# Patient Record
Sex: MALE | Race: WHITE | Employment: OTHER | URBAN - METROPOLITAN AREA
[De-identification: names, ages, dates, MRNs, and addresses within clinical notes are randomized per-mention and may not be internally consistent; named-entity substitution may affect disease eponyms.]

---

## 2017-03-14 ENCOUNTER — OFFICE VISIT (OUTPATIENT)
Dept: CARDIOLOGY CLINIC | Age: 77
End: 2017-03-14

## 2017-03-14 VITALS
SYSTOLIC BLOOD PRESSURE: 118 MMHG | DIASTOLIC BLOOD PRESSURE: 62 MMHG | WEIGHT: 154.2 LBS | BODY MASS INDEX: 20.44 KG/M2 | RESPIRATION RATE: 16 BRPM | HEART RATE: 72 BPM | HEIGHT: 73 IN

## 2017-03-14 DIAGNOSIS — Z95.1 S/P CABG X 1: ICD-10-CM

## 2017-03-14 DIAGNOSIS — I25.10 CORONARY ARTERY DISEASE INVOLVING NATIVE CORONARY ARTERY OF NATIVE HEART WITHOUT ANGINA PECTORIS: Primary | ICD-10-CM

## 2017-03-14 DIAGNOSIS — I26.99 BILATERAL PULMONARY EMBOLISM (HCC): ICD-10-CM

## 2017-03-14 DIAGNOSIS — I10 BENIGN ESSENTIAL HTN: ICD-10-CM

## 2017-03-14 DIAGNOSIS — D15.1 ATRIAL MYXOMA: ICD-10-CM

## 2017-03-14 RX ORDER — ATORVASTATIN CALCIUM 10 MG/1
10 TABLET, FILM COATED ORAL
Qty: 90 TAB | Refills: 0 | Status: SHIPPED | OUTPATIENT
Start: 2017-03-14

## 2017-03-14 RX ORDER — METOPROLOL TARTRATE 25 MG/1
25 TABLET, FILM COATED ORAL 2 TIMES DAILY
Qty: 180 TAB | Refills: 0 | Status: SHIPPED | OUTPATIENT
Start: 2017-03-14

## 2017-03-14 NOTE — PROGRESS NOTES
[place]: Alex Oakes  [phone]    History of Present Illness:   Mr. 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 Additional workup by Dr. Chris Carrillo found a significant decrease in the pulmonary mass and mediastinal lymphadenopathy. 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 He proceeded with a single vessel bypass and left atrial myxoma resection on 09/15/2015 successfully with Dr. Osito Krause. Since his last visit, he has been doing well. He denies any chest pain, shortness of breath, palpitations, lightheadedness or dizziness. He has been compliant with his medications. He does note that his Eliquis is very expensive, but that he is concerned about Coumadin checks and dietary restrictions. EKG is normal sinus rhythm with a heart rate of 72 and right bundle-branch block, previously known. He is compensated on exam with clear lungs and a I/VI systolic murmur at the left upper sternal border, unchanged. Assessment and Plan:   1. Coronary artery disease, status post bypass and resection for left atrial myxoma. Continues stable and compensated. Recommend he follow up in a year, but he was hopeful that he could just follow up as needed. I did recommend that he at least follow up in two years. We did talk about symptoms to look out for. Continue statin. He had bruising on aspirin and Eliquis and continues on just Eliquis. Though this was expensive, he says that he would prefer this rather than the Coumadin where he would have to get the fingerstick checks. 2. Bilateral PE.   Long term decision was made for him to be on long term blood thinners and given his history of pulmonary infarct. Followed by pulmonary. 3. Essential hypertension. Blood pressure is controlled and no changes made. 4. History of DVT. 5. Type 2 diabetes. He  has a past medical history of DM (diabetes mellitus) ([facility] [address] DVT (deep venous thrombosis) (HCC) (Right Leg); HTN (hypertension); PE (pulmonary embolism); Pneumonia; and TIA (transient ischemic attack) (2003). All other systems negative except as above. PE  Vitals:    03/14/17 1231   BP: 118/62   Pulse: 72   Resp: 16   Weight: 154 lb 3.2 oz (69.9 kg)   Height: 6' 1\" (1.854 m)    Body mass index is 20.34 kg/(m^2).    General appearance - alert, well appearing, and in no distress  Mental status - affect appropriate to mood  Eyes - sclera anicteric, moist mucous membranes  Neck - supple, no significant adenopathy, no JVD  Chest - clear to auscultation, no wheezes, rales or rhonchi  Heart - normal rate, regular rhythm, normal S1, S2, I-II/VI systolic murmur LUSB  Abdomen - soft, nontender, nondistended, no masses or organomegaly  Extremities - peripheral pulses normal, no pedal edema      Recent Labs:  Lab Results   Component Value Date/Time    Cholesterol, total 76 07/21/2015 05:12 AM    HDL Cholesterol 23 07/21/2015 05:12 AM    LDL, calculated 37.6 07/21/2015 05:12 AM    Triglyceride 77 07/21/2015 05:12 AM    CHOL/HDL Ratio 3.3 07/21/2015 05:12 AM     Lab Results   Component Value Date/Time    Creatinine 0.97 09/18/2015 04:25 AM     Lab Results   Component Value Date/Time    BUN 30 09/18/2015 04:25 AM     Lab Results   Component Value Date/Time    Potassium 4.6 09/18/2015 04:25 AM     Lab Results   Component Value Date/Time    Hemoglobin A1c 6.1 09/09/2015 09:39 AM     Lab Results   Component Value Date/Time    HGB 10.6 09/18/2015 04:25 AM     Lab Results   Component Value Date/Time    PLATELET 364 49/63/1964 04:25 AM       Reviewed:  Past Medical History:   Diagnosis Date    DM (diabetes mellitus) ([facility] [address]     DVT (deep venous thrombosis) (HCC) Right Leg    HTN (hypertension)     PE (pulmonary embolism)     Pneumonia     TIA (transient ischemic attack) 2003     History   Smoking Status    Never Smoker   Smokeless Tobacco    Never Used     History   Alcohol Use No     Allergies   Allergen Reactions    Pcn [Penicillins] Nausea and Vomiting       Current Outpatient Prescriptions   Medication Sig    metoprolol tartrate (LOPRESSOR) 25 mg tablet Take 1 Tab by mouth two (2) times a day.  apixaban (ELIQUIS) 5 mg tablet Take 1 Tab by mouth two (2) times a day. Indications: Pulmonary Thromboembolism    atorvastatin (LIPITOR) 10 mg tablet Take 1 Tab by mouth nightly.  metFORMIN (GLUCOPHAGE) 1,000 mg tablet Take 1,000 mg by mouth daily.  tamsulosin (FLOMAX) 0.4 mg capsule Take  by mouth as needed.  therapeutic multivitamin (THERAGRAN) tablet Take 1 Tab by mouth daily. No current facility-administered medications for this visit.         MD Naveed Vega [facility] [place]  [address] [facility] [address]  [address]

## 2017-03-14 NOTE — LETTER
3/15/2017 9:12 AM 
 
Patient:  Willard Oquendo YOB: 1940 Date of Visit: 3/14/2017 Dear Raz Guerra MD 
[address] [facility] [address] [phone] VIA In Basket 
 : 
 Mr. 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 Additional workup by Dr. Lina Patterson found a significant decrease in the pulmonary mass and mediastinal lymphadenopathy. 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 He proceeded with a single vessel bypass and left atrial myxoma resection on 09/15/2015 successfully with Dr. Jessica Myers. Since his last visit, he has been doing well. He denies any chest pain, shortness of breath, palpitations, lightheadedness or dizziness. He has been compliant with his medications. He does note that his Eliquis is very expensive, but that he is concerned about Coumadin checks and dietary restrictions. EKG is normal sinus rhythm with a heart rate of 72 and right bundle-branch block, previously known. He is compensated on exam with clear lungs and a I/VI systolic murmur at the left upper sternal border, unchanged. Assessment and Plan: 1. Coronary artery disease, status post bypass and resection for left atrial myxoma. Continues stable and compensated. Recommend he follow up in a year, but he was hopeful that he could just follow up as needed. I did recommend that he at least follow up in two years. We did talk about symptoms to look out for. Continue statin. He had bruising on aspirin and Eliquis and continues on just Eliquis. Though this was expensive, he says that he would prefer this rather than the Coumadin where he would have to get the fingerstick checks. 2. Bilateral PE. Long term decision was made for him to be on long term blood thinners and given his history of pulmonary infarct. Followed by pulmonary. 3. Essential hypertension. Blood pressure is controlled and no changes made. 4. History of DVT. 5. Type 2 diabetes. If you have questions, please do not hesitate to call me. Sincerely, Han Son MD

## 2017-03-15 PROBLEM — I10 BENIGN ESSENTIAL HTN: Status: ACTIVE | Noted: 2017-03-15

## 2017-06-05 RX ORDER — METOPROLOL TARTRATE 25 MG/1
TABLET, FILM COATED ORAL
Qty: 180 TAB | Refills: 0 | Status: SHIPPED | OUTPATIENT
Start: 2017-06-05 | End: 2022-10-29 | Stop reason: CLARIF

## 2022-03-19 PROBLEM — I10 BENIGN ESSENTIAL HTN: Status: ACTIVE | Noted: 2017-03-15

## 2022-10-29 ENCOUNTER — HOSPITAL ENCOUNTER (OUTPATIENT)
Age: 82
Setting detail: OBSERVATION
Discharge: HOME | End: 2022-10-30
Attending: EMERGENCY MEDICINE | Admitting: INTERNAL MEDICINE
Payer: MEDICARE

## 2022-10-29 ENCOUNTER — APPOINTMENT (OUTPATIENT)
Dept: CT IMAGING | Age: 82
End: 2022-10-29
Attending: EMERGENCY MEDICINE
Payer: MEDICARE

## 2022-10-29 ENCOUNTER — APPOINTMENT (OUTPATIENT)
Dept: GENERAL RADIOLOGY | Age: 82
End: 2022-10-29
Attending: EMERGENCY MEDICINE
Payer: MEDICARE

## 2022-10-29 ENCOUNTER — APPOINTMENT (OUTPATIENT)
Dept: MRI IMAGING | Age: 82
End: 2022-10-29
Attending: EMERGENCY MEDICINE
Payer: MEDICARE

## 2022-10-29 DIAGNOSIS — R51.9 ACUTE NONINTRACTABLE HEADACHE, UNSPECIFIED HEADACHE TYPE: Primary | ICD-10-CM

## 2022-10-29 DIAGNOSIS — G45.4 TRANSIENT GLOBAL AMNESIA: ICD-10-CM

## 2022-10-29 PROBLEM — G45.9 TIA (TRANSIENT ISCHEMIC ATTACK): Status: ACTIVE | Noted: 2022-10-29

## 2022-10-29 LAB
ALBUMIN SERPL-MCNC: 3.5 G/DL (ref 3.5–5)
ALBUMIN/GLOB SERPL: 1 {RATIO} (ref 1.1–2.2)
ALP SERPL-CCNC: 80 U/L (ref 45–117)
ALT SERPL-CCNC: 20 U/L (ref 12–78)
ANION GAP SERPL CALC-SCNC: 5 MMOL/L (ref 5–15)
AST SERPL-CCNC: 15 U/L (ref 15–37)
ATRIAL RATE: 77 BPM
BASOPHILS # BLD: 0.1 K/UL (ref 0–0.1)
BASOPHILS NFR BLD: 1 % (ref 0–1)
BILIRUB SERPL-MCNC: 0.8 MG/DL (ref 0.2–1)
BUN SERPL-MCNC: 18 MG/DL (ref 6–20)
BUN/CREAT SERPL: 17 (ref 12–20)
CALCIUM SERPL-MCNC: 9 MG/DL (ref 8.5–10.1)
CALCULATED P AXIS, ECG09: 99 DEGREES
CALCULATED R AXIS, ECG10: -71 DEGREES
CALCULATED T AXIS, ECG11: 42 DEGREES
CHLORIDE SERPL-SCNC: 107 MMOL/L (ref 97–108)
CO2 SERPL-SCNC: 27 MMOL/L (ref 21–32)
COMMENT, HOLDF: NORMAL
CREAT SERPL-MCNC: 1.06 MG/DL (ref 0.7–1.3)
DIAGNOSIS, 93000: NORMAL
DIFFERENTIAL METHOD BLD: NORMAL
EOSINOPHIL # BLD: 0.3 K/UL (ref 0–0.4)
EOSINOPHIL NFR BLD: 5 % (ref 0–7)
ERYTHROCYTE [DISTWIDTH] IN BLOOD BY AUTOMATED COUNT: 13.1 % (ref 11.5–14.5)
GLOBULIN SER CALC-MCNC: 3.4 G/DL (ref 2–4)
GLUCOSE BLD STRIP.AUTO-MCNC: 145 MG/DL (ref 65–117)
GLUCOSE BLD STRIP.AUTO-MCNC: 213 MG/DL (ref 65–117)
GLUCOSE BLD STRIP.AUTO-MCNC: 244 MG/DL (ref 65–117)
GLUCOSE SERPL-MCNC: 163 MG/DL (ref 65–100)
HCT VFR BLD AUTO: 42.9 % (ref 36.6–50.3)
HGB BLD-MCNC: 14.2 G/DL (ref 12.1–17)
IMM GRANULOCYTES # BLD AUTO: 0 K/UL (ref 0–0.04)
IMM GRANULOCYTES NFR BLD AUTO: 0 % (ref 0–0.5)
INR PPP: 1.2 (ref 0.9–1.1)
LYMPHOCYTES # BLD: 1.7 K/UL (ref 0.8–3.5)
LYMPHOCYTES NFR BLD: 26 % (ref 12–49)
MCH RBC QN AUTO: 32.4 PG (ref 26–34)
MCHC RBC AUTO-ENTMCNC: 33.1 G/DL (ref 30–36.5)
MCV RBC AUTO: 97.9 FL (ref 80–99)
MONOCYTES # BLD: 0.7 K/UL (ref 0–1)
MONOCYTES NFR BLD: 10 % (ref 5–13)
NEUTS SEG # BLD: 3.7 K/UL (ref 1.8–8)
NEUTS SEG NFR BLD: 58 % (ref 32–75)
NRBC # BLD: 0 K/UL (ref 0–0.01)
NRBC BLD-RTO: 0 PER 100 WBC
P-R INTERVAL, ECG05: 180 MS
PLATELET # BLD AUTO: 207 K/UL (ref 150–400)
PMV BLD AUTO: 8.9 FL (ref 8.9–12.9)
POTASSIUM SERPL-SCNC: 4.4 MMOL/L (ref 3.5–5.1)
PROT SERPL-MCNC: 6.9 G/DL (ref 6.4–8.2)
PROTHROMBIN TIME: 12.2 SEC (ref 9–11.1)
Q-T INTERVAL, ECG07: 420 MS
QRS DURATION, ECG06: 124 MS
QTC CALCULATION (BEZET), ECG08: 475 MS
RBC # BLD AUTO: 4.38 M/UL (ref 4.1–5.7)
SAMPLES BEING HELD,HOLD: NORMAL
SERVICE CMNT-IMP: ABNORMAL
SODIUM SERPL-SCNC: 139 MMOL/L (ref 136–145)
TROPONIN-HIGH SENSITIVITY: 15 NG/L (ref 0–76)
VENTRICULAR RATE, ECG03: 77 BPM
WBC # BLD AUTO: 6.3 K/UL (ref 4.1–11.1)

## 2022-10-29 PROCEDURE — 96375 TX/PRO/DX INJ NEW DRUG ADDON: CPT

## 2022-10-29 PROCEDURE — 97535 SELF CARE MNGMENT TRAINING: CPT

## 2022-10-29 PROCEDURE — 36415 COLL VENOUS BLD VENIPUNCTURE: CPT

## 2022-10-29 PROCEDURE — 74011636637 HC RX REV CODE- 636/637: Performed by: INTERNAL MEDICINE

## 2022-10-29 PROCEDURE — 80053 COMPREHEN METABOLIC PANEL: CPT

## 2022-10-29 PROCEDURE — 0042T CT CODE NEURO PERF W CBF: CPT

## 2022-10-29 PROCEDURE — 70553 MRI BRAIN STEM W/O & W/DYE: CPT

## 2022-10-29 PROCEDURE — G0378 HOSPITAL OBSERVATION PER HR: HCPCS

## 2022-10-29 PROCEDURE — 71045 X-RAY EXAM CHEST 1 VIEW: CPT

## 2022-10-29 PROCEDURE — 96366 THER/PROPH/DIAG IV INF ADDON: CPT

## 2022-10-29 PROCEDURE — 74011250637 HC RX REV CODE- 250/637: Performed by: INTERNAL MEDICINE

## 2022-10-29 PROCEDURE — 97165 OT EVAL LOW COMPLEX 30 MIN: CPT

## 2022-10-29 PROCEDURE — 70450 CT HEAD/BRAIN W/O DYE: CPT

## 2022-10-29 PROCEDURE — 85025 COMPLETE CBC W/AUTO DIFF WBC: CPT

## 2022-10-29 PROCEDURE — 70496 CT ANGIOGRAPHY HEAD: CPT

## 2022-10-29 PROCEDURE — 85610 PROTHROMBIN TIME: CPT

## 2022-10-29 PROCEDURE — 84484 ASSAY OF TROPONIN QUANT: CPT

## 2022-10-29 PROCEDURE — 74011000636 HC RX REV CODE- 636: Performed by: EMERGENCY MEDICINE

## 2022-10-29 PROCEDURE — 74011250637 HC RX REV CODE- 250/637: Performed by: PSYCHIATRY & NEUROLOGY

## 2022-10-29 PROCEDURE — 74011250636 HC RX REV CODE- 250/636: Performed by: EMERGENCY MEDICINE

## 2022-10-29 PROCEDURE — 93005 ELECTROCARDIOGRAM TRACING: CPT

## 2022-10-29 PROCEDURE — 96365 THER/PROPH/DIAG IV INF INIT: CPT

## 2022-10-29 PROCEDURE — 74011250636 HC RX REV CODE- 250/636: Performed by: INTERNAL MEDICINE

## 2022-10-29 PROCEDURE — 99285 EMERGENCY DEPT VISIT HI MDM: CPT

## 2022-10-29 PROCEDURE — 99205 OFFICE O/P NEW HI 60 MIN: CPT | Performed by: PSYCHIATRY & NEUROLOGY

## 2022-10-29 PROCEDURE — 82962 GLUCOSE BLOOD TEST: CPT

## 2022-10-29 PROCEDURE — A9576 INJ PROHANCE MULTIPACK: HCPCS | Performed by: INTERNAL MEDICINE

## 2022-10-29 PROCEDURE — 97161 PT EVAL LOW COMPLEX 20 MIN: CPT

## 2022-10-29 RX ORDER — DROPERIDOL 2.5 MG/ML
0.62 INJECTION, SOLUTION INTRAMUSCULAR; INTRAVENOUS ONCE
Status: COMPLETED | OUTPATIENT
Start: 2022-10-29 | End: 2022-10-29

## 2022-10-29 RX ORDER — INSULIN LISPRO 100 [IU]/ML
INJECTION, SOLUTION INTRAVENOUS; SUBCUTANEOUS
Status: DISCONTINUED | OUTPATIENT
Start: 2022-10-29 | End: 2022-10-30 | Stop reason: HOSPADM

## 2022-10-29 RX ORDER — ATORVASTATIN CALCIUM 10 MG/1
10 TABLET, FILM COATED ORAL
Status: DISCONTINUED | OUTPATIENT
Start: 2022-10-29 | End: 2022-10-30 | Stop reason: HOSPADM

## 2022-10-29 RX ORDER — DOXYCYCLINE 100 MG/1
100 CAPSULE ORAL 2 TIMES DAILY
COMMUNITY
End: 2022-11-22 | Stop reason: ALTCHOICE

## 2022-10-29 RX ORDER — AMITRIPTYLINE HYDROCHLORIDE 10 MG/1
10 TABLET, FILM COATED ORAL
Status: DISCONTINUED | OUTPATIENT
Start: 2022-10-29 | End: 2022-10-30 | Stop reason: HOSPADM

## 2022-10-29 RX ORDER — DEXTROSE MONOHYDRATE 100 MG/ML
0-250 INJECTION, SOLUTION INTRAVENOUS AS NEEDED
Status: DISCONTINUED | OUTPATIENT
Start: 2022-10-29 | End: 2022-10-30 | Stop reason: HOSPADM

## 2022-10-29 RX ORDER — MAGNESIUM SULFATE HEPTAHYDRATE 40 MG/ML
2 INJECTION, SOLUTION INTRAVENOUS
Status: COMPLETED | OUTPATIENT
Start: 2022-10-29 | End: 2022-10-29

## 2022-10-29 RX ORDER — PREGABALIN 75 MG/1
75 CAPSULE ORAL
Status: DISCONTINUED | OUTPATIENT
Start: 2022-10-29 | End: 2022-10-29

## 2022-10-29 RX ORDER — THERA TABS 400 MCG
1 TAB ORAL DAILY
Status: DISCONTINUED | OUTPATIENT
Start: 2022-10-29 | End: 2022-10-30 | Stop reason: HOSPADM

## 2022-10-29 RX ORDER — GUAIFENESIN 100 MG/5ML
81 LIQUID (ML) ORAL DAILY
Status: DISCONTINUED | OUTPATIENT
Start: 2022-10-29 | End: 2022-10-30 | Stop reason: HOSPADM

## 2022-10-29 RX ORDER — ACETAMINOPHEN 325 MG/1
650 TABLET ORAL
Status: DISCONTINUED | OUTPATIENT
Start: 2022-10-29 | End: 2022-10-30 | Stop reason: HOSPADM

## 2022-10-29 RX ORDER — LANOLIN ALCOHOL/MO/W.PET/CERES
6 CREAM (GRAM) TOPICAL
Status: DISCONTINUED | OUTPATIENT
Start: 2022-10-29 | End: 2022-10-30 | Stop reason: HOSPADM

## 2022-10-29 RX ORDER — TAMSULOSIN HYDROCHLORIDE 0.4 MG/1
0.4 CAPSULE ORAL DAILY
Status: DISCONTINUED | OUTPATIENT
Start: 2022-10-29 | End: 2022-10-30 | Stop reason: HOSPADM

## 2022-10-29 RX ORDER — BUTALBITAL, ACETAMINOPHEN AND CAFFEINE 50; 325; 40 MG/1; MG/1; MG/1
1 TABLET ORAL
Status: DISCONTINUED | OUTPATIENT
Start: 2022-10-29 | End: 2022-10-30 | Stop reason: HOSPADM

## 2022-10-29 RX ORDER — HYDRALAZINE HYDROCHLORIDE 20 MG/ML
10 INJECTION INTRAMUSCULAR; INTRAVENOUS
Status: DISCONTINUED | OUTPATIENT
Start: 2022-10-29 | End: 2022-10-30 | Stop reason: HOSPADM

## 2022-10-29 RX ORDER — MAGNESIUM SULFATE 100 %
4 CRYSTALS MISCELLANEOUS AS NEEDED
Status: DISCONTINUED | OUTPATIENT
Start: 2022-10-29 | End: 2022-10-30 | Stop reason: HOSPADM

## 2022-10-29 RX ORDER — METOPROLOL TARTRATE 25 MG/1
25 TABLET, FILM COATED ORAL 2 TIMES DAILY
Status: DISCONTINUED | OUTPATIENT
Start: 2022-10-29 | End: 2022-10-30 | Stop reason: HOSPADM

## 2022-10-29 RX ADMIN — METOPROLOL TARTRATE 25 MG: 25 TABLET, FILM COATED ORAL at 08:45

## 2022-10-29 RX ADMIN — MELATONIN 6 MG: at 22:14

## 2022-10-29 RX ADMIN — MAGNESIUM SULFATE HEPTAHYDRATE 2 G: 40 INJECTION, SOLUTION INTRAVENOUS at 04:04

## 2022-10-29 RX ADMIN — APIXABAN 5 MG: 5 TABLET, FILM COATED ORAL at 17:39

## 2022-10-29 RX ADMIN — TAMSULOSIN HYDROCHLORIDE 0.4 MG: 0.4 CAPSULE ORAL at 08:45

## 2022-10-29 RX ADMIN — Medication 2 UNITS: at 22:14

## 2022-10-29 RX ADMIN — Medication 2 UNITS: at 17:45

## 2022-10-29 RX ADMIN — METOPROLOL TARTRATE 25 MG: 25 TABLET, FILM COATED ORAL at 17:39

## 2022-10-29 RX ADMIN — THERA TABS 1 TABLET: TAB at 08:45

## 2022-10-29 RX ADMIN — IOPAMIDOL 100 ML: 755 INJECTION, SOLUTION INTRAVENOUS at 02:40

## 2022-10-29 RX ADMIN — APIXABAN 5 MG: 5 TABLET, FILM COATED ORAL at 08:45

## 2022-10-29 RX ADMIN — AMITRIPTYLINE HYDROCHLORIDE 10 MG: 10 TABLET, FILM COATED ORAL at 22:14

## 2022-10-29 RX ADMIN — DROPERIDOL 0.62 MG: 2.5 INJECTION, SOLUTION INTRAMUSCULAR; INTRAVENOUS at 04:04

## 2022-10-29 RX ADMIN — ATORVASTATIN CALCIUM 10 MG: 10 TABLET, FILM COATED ORAL at 22:14

## 2022-10-29 RX ADMIN — Medication 3 UNITS: at 13:06

## 2022-10-29 RX ADMIN — GADOTERIDOL 15 ML: 279.3 INJECTION, SOLUTION INTRAVENOUS at 08:17

## 2022-10-29 RX ADMIN — BUTALBITAL, ACETAMINOPHEN, AND CAFFEINE 1 TABLET: 50; 325; 40 TABLET ORAL at 17:53

## 2022-10-29 RX ADMIN — ASPIRIN 81 MG: 81 TABLET, CHEWABLE ORAL at 08:45

## 2022-10-29 NOTE — PROGRESS NOTES
-Please complete MRI History and Safety Screening Form   - Patient cannot be scanned until this form is completed, including signatures, and reviewed in MRI to ensure patient is SAFE and eligible for MRI. - CALL MRI when this has been successfully completed at [phone].

## 2022-10-29 NOTE — ED PROVIDER NOTES
EMERGENCY DEPARTMENT HISTORY AND PHYSICAL EXAM      Date: (Not on file)  Patient Name: Srinivas Mcmahan    History of Presenting Illness     Chief Complaint   Patient presents with    Headache       History Provided By: Patient    HPI: Srinivas Mcmahan, 80 y.o. male presents to the ED with cc of headache and confusion. Patient states that he did have COVID approximately 4 weeks ago and since then he has had a headache. He states the headache has been worsening in severity since. He states that tonight his headache was severe when he went to bed at approximately 9:00. He states upon waking at 1 AM he did not know where he was who he was with or where he was at. Wife denies any slurred speech or facial droop or lack of strength on one side. He denies any current lack of sensation or focal weakness. He states he has a headache currently rated 10 out of 10 with no alleviating exacerbating factors. He denies any photophobia or visual disturbance. He denies any chest pain or shortness of breath. He denies any abdominal pain, nausea, vomiting or diarrhea. He denies any recent urinary symptoms. He states that he recently had a skin cancer placed taken off the back of his neck and has been taking doxycycline since then. There are no other complaints, changes, or physical findings at this time. PCP: Sekou Dunham MD    No current facility-administered medications on file prior to encounter. Current Outpatient Medications on File Prior to Encounter   Medication Sig Dispense Refill    metoprolol tartrate (LOPRESSOR) 25 mg tablet TAKE 1 TABLET BY MOUTH TWICE DAILY 180 Tab 0    metoprolol tartrate (LOPRESSOR) 25 mg tablet Take 1 Tab by mouth two (2) times a day. 180 Tab 0    apixaban (ELIQUIS) 5 mg tablet Take 1 Tab by mouth two (2) times a day. Indications: Pulmonary Thromboembolism 180 Tab 0    atorvastatin (LIPITOR) 10 mg tablet Take 1 Tab by mouth nightly.  90 Tab 0    metFORMIN (GLUCOPHAGE) 1,000 mg tablet Take 1,000 mg by mouth daily. tamsulosin (FLOMAX) 0.4 mg capsule Take  by mouth as needed. 3    therapeutic multivitamin (THERAGRAN) tablet Take 1 Tab by mouth daily. Past History     Past Medical History:  Past Medical History:   Diagnosis Date    DM (diabetes mellitus) ([facility] [address]     DVT (deep venous thrombosis) (HCC) Right Leg    HTN (hypertension)     PE (pulmonary embolism)     Pneumonia     TIA (transient ischemic attack) 2003       Past Surgical History:  Past Surgical History:   Procedure Laterality Date    CARDIAC SURG PROCEDURE UNLIST      cardiac tumor removed    HX CORONARY ARTERY BYPASS GRAFT  7/20/15       Family History:  Family History   Problem Relation Age of Onset    Heart Attack Father     Cancer Father         Lung       Social History:  Social History     Tobacco Use    Smoking status: Never    Smokeless tobacco: Never   Substance Use Topics    Alcohol use: No     Alcohol/week: 0.0 standard drinks    Drug use: No       Allergies: Allergies   Allergen Reactions    Pcn [Penicillins] Nausea and Vomiting         Review of Systems   Review of Systems   Constitutional: Negative. Negative for appetite change, chills, fatigue and fever. HENT: Negative. Negative for congestion, rhinorrhea, sinus pressure and sore throat. Eyes: Negative. Respiratory: Negative. Negative for cough, choking, chest tightness, shortness of breath and wheezing. Cardiovascular: Negative. Negative for chest pain, palpitations and leg swelling. Gastrointestinal:  Negative for abdominal pain, constipation, diarrhea, nausea and vomiting. Endocrine: Negative. Genitourinary: Negative. Negative for difficulty urinating, dysuria, flank pain and urgency. Musculoskeletal: Negative. Skin: Negative. Neurological:  Positive for headaches. Negative for dizziness, speech difficulty, weakness, light-headedness and numbness. Psychiatric/Behavioral:  Positive for confusion.     All other systems reviewed and are negative. Physical Exam   Physical Exam  Vitals and nursing note reviewed. Constitutional:       General: He is not in acute distress. Appearance: He is well-developed. He is not diaphoretic. HENT:      Head: Normocephalic and atraumatic. Mouth/Throat:      Mouth: Mucous membranes are moist.      Pharynx: No oropharyngeal exudate. Eyes:      Extraocular Movements: Extraocular movements intact. Conjunctiva/sclera: Conjunctivae normal.      Pupils: Pupils are equal, round, and reactive to light. Neck:      Vascular: No JVD. Trachea: No tracheal deviation. Cardiovascular:      Rate and Rhythm: Normal rate and regular rhythm. Heart sounds: Normal heart sounds. No murmur heard. Pulmonary:      Effort: Pulmonary effort is normal. No respiratory distress. Breath sounds: Normal breath sounds. No stridor. No wheezing or rales. Abdominal:      General: There is no distension. Palpations: Abdomen is soft. Tenderness: There is no abdominal tenderness. There is no guarding or rebound. Musculoskeletal:         General: No tenderness. Normal range of motion. Cervical back: Normal range of motion and neck supple. Right lower leg: No edema. Left lower leg: No edema. Skin:     General: Skin is warm and dry. Capillary Refill: Capillary refill takes less than 2 seconds. Neurological:      Mental Status: He is alert and oriented to person, place, and time. Cranial Nerves: No cranial nerve deficit.       Comments: No focal motor or sensory deficits    Psychiatric:         Behavior: Behavior normal.      Comments: Anxious        Diagnostic Study Results     Labs -     Recent Results (from the past 12 hour(s))   GLUCOSE, POC    Collection Time: 10/29/22  2:10 AM   Result Value Ref Range    Glucose (POC) 145 (H) 65 - 117 mg/dL    Performed by Farrah Herbert    CBC WITH AUTOMATED DIFF    Collection Time: 10/29/22  2:21 AM Result Value Ref Range    WBC 6.3 4.1 - 11.1 K/uL    RBC 4.38 4.10 - 5.70 M/uL    HGB 14.2 12.1 - 17.0 g/dL    HCT 42.9 36.6 - 50.3 %    MCV 97.9 80.0 - 99.0 FL    MCH 32.4 26.0 - 34.0 PG    MCHC 33.1 30.0 - 36.5 g/dL    RDW 13.1 11.5 - 14.5 %    PLATELET 905 024 - 013 K/uL    MPV 8.9 8.9 - 12.9 FL    NRBC 0.0 0  WBC    ABSOLUTE NRBC 0.00 0.00 - 0.01 K/uL    NEUTROPHILS 58 32 - 75 %    LYMPHOCYTES 26 12 - 49 %    MONOCYTES 10 5 - 13 %    EOSINOPHILS 5 0 - 7 %    BASOPHILS 1 0 - 1 %    IMMATURE GRANULOCYTES 0 0.0 - 0.5 %    ABS. NEUTROPHILS 3.7 1.8 - 8.0 K/UL    ABS. LYMPHOCYTES 1.7 0.8 - 3.5 K/UL    ABS. MONOCYTES 0.7 0.0 - 1.0 K/UL    ABS. EOSINOPHILS 0.3 0.0 - 0.4 K/UL    ABS. BASOPHILS 0.1 0.0 - 0.1 K/UL    ABS. IMM. GRANS. 0.0 0.00 - 0.04 K/UL    DF AUTOMATED     METABOLIC PANEL, COMPREHENSIVE    Collection Time: 10/29/22  2:21 AM   Result Value Ref Range    Sodium 139 136 - 145 mmol/L    Potassium 4.4 3.5 - 5.1 mmol/L    Chloride 107 97 - 108 mmol/L    CO2 27 21 - 32 mmol/L    Anion gap 5 5 - 15 mmol/L    Glucose 163 (H) 65 - 100 mg/dL    BUN 18 6 - 20 MG/DL    Creatinine 1.06 0.70 - 1.30 MG/DL    BUN/Creatinine ratio 17 12 - 20      eGFR >60 >60 ml/min/1.73m2    Calcium 9.0 8.5 - 10.1 MG/DL    Bilirubin, total 0.8 0.2 - 1.0 MG/DL    ALT (SGPT) 20 12 - 78 U/L    AST (SGOT) 15 15 - 37 U/L    Alk.  phosphatase 80 45 - 117 U/L    Protein, total 6.9 6.4 - 8.2 g/dL    Albumin 3.5 3.5 - 5.0 g/dL    Globulin 3.4 2.0 - 4.0 g/dL    A-G Ratio 1.0 (L) 1.1 - 2.2     PROTHROMBIN TIME + INR    Collection Time: 10/29/22  2:21 AM   Result Value Ref Range    INR 1.2 (H) 0.9 - 1.1      Prothrombin time 12.2 (H) 9.0 - 11.1 sec   TROPONIN-HIGH SENSITIVITY    Collection Time: 10/29/22  2:21 AM   Result Value Ref Range    Troponin-High Sensitivity 15 0 - 76 ng/L   SAMPLES BEING HELD    Collection Time: 10/29/22  2:21 AM   Result Value Ref Range    SAMPLES BEING HELD RED     COMMENT        Add-on orders for these samples will be processed based on acceptable specimen integrity and analyte stability, which may vary by analyte. EKG, 12 LEAD, INITIAL    Collection Time: 10/29/22  2:37 AM   Result Value Ref Range    Ventricular Rate 77 BPM    Atrial Rate 77 BPM    P-R Interval 180 ms    QRS Duration 124 ms    Q-T Interval 420 ms    QTC Calculation (Bezet) 475 ms    Calculated P Axis 99 degrees    Calculated R Axis -71 degrees    Calculated T Axis 42 degrees    Diagnosis       Sinus rhythm with occasional premature ventricular complexes  Right bundle branch block  Left anterior fascicular block  ** Bifascicular block **  Anterior infarct , age undetermined  When compared with ECG of 09-SEP-2015 10:59,  premature ventricular complexes are now present  (RBBB and left anterior fascicular block) is now present  Anterior infarct is now present         Radiologic Studies -   XR CHEST PORT   Final Result   No acute findings. CTA CODE NEURO HEAD AND NECK W CONT   Final Result   1. No acute large vessel occlusion. Atherosclerotic vascular disease without   flow-limiting stenosis as above. 2.  Areas of hypoperfusion in the superior frontal lobes bilaterally most likely   artifactual, though if there is clinical concern for acute infarction consider   MRI. 3. Vague nodular densities superior segment right lower lobe, and interval   enlargement of AP window lymph nodes up to 1.9 cm. This may be better assessed   with nonemergent outpatient CT chest as clinically indicated. CT CODE NEURO PERF W CBF   Final Result   1. No acute large vessel occlusion. Atherosclerotic vascular disease without   flow-limiting stenosis as above. 2.  Areas of hypoperfusion in the superior frontal lobes bilaterally most likely   artifactual, though if there is clinical concern for acute infarction consider   MRI. 3. Vague nodular densities superior segment right lower lobe, and interval   enlargement of AP window lymph nodes up to 1.9 cm.  This may be better assessed   with nonemergent outpatient CT chest as clinically indicated. CT CODE NEURO HEAD WO CONTRAST   Final Result   No acute intracranial hemorrhage, mass or infarct. Cerebral atrophy   and white matter change most compatible with chronic small vessel ischemic   and/or senescent change. Intracranial atherosclerosis. Paranasal sinus disease. MRI BRAIN W WO CONT    (Results Pending)     CT Results  (Last 48 hours)      None          CXR Results  (Last 48 hours)      None            Medical Decision Making   I am the first provider for this patient. I reviewed the vital signs, available nursing notes, past medical history, past surgical history, family history and social history. Vital Signs-Reviewed the patient's vital signs. Patient Vitals for the past 12 hrs:   Temp Pulse Resp BP SpO2   10/29/22 0158 98.1 °F (36.7 °C) 83 18 (!) 180/64 98 %       EKG interpretation: (Preliminary)  Sinus rhythm rate 77, left anterior fascicular block, right bundle branch block, prolonged QRS, no acute ST changes. Records Reviewed: Nursing Notes, Old Medical Records, Previous Radiology Studies, and Previous Laboratory Studies, patient with a history of hypertension coronary artery disease is also had a prior PE and DVT currently on Eliquis. Provider Notes (Medical Decision Making):   DDx-TGA, TIA, migraine, ICH, CVA    ED Course:   Initial assessment performed. The patients presenting problems have been discussed, and they are in agreement with the care plan formulated and outlined with them. I have encouraged them to ask questions as they arise throughout their visit. Consult note:  Case discussed with telemetry neurology. Images all unremarkable.   States that symptoms probably more consistent with migraine however given the patient's prior history of COVID a month ago would recommend admission for MRI with and without to rule out any possible inflammatory changes within the cerebral resulting in his headache however he would recommend treatment as a migraine. Patient was given a dose of Inapsine in addition to 2 g of magnesium. I have placed the order for the MRI and discussed plan of care with the patient and his wife. Consult note:  Case discussed with Dr. Marcie Patino patient will be evaluated and admitted. Disposition:  Admit      Diagnosis     Clinical Impression:   1. Acute nonintractable headache, unspecified headache type    2. Transient global amnesia        Attestations:    Jamar Carr, DO        Please note that this dictation was completed with [employer], the computer voice recognition software. Quite often unanticipated grammatical, syntax, homophones, and other interpretive errors are inadvertently transcribed by the computer software. Please disregard these errors. Please excuse any errors that have escaped final proofreading. Thank you. Yes

## 2022-10-29 NOTE — PROGRESS NOTES
Nutrition: Chart reviewed due to MST referral; this was filed incorrectly. See below. RD available by consult if needs arise; otherwise, patient will be rescreened per policy. Thanks.      Malnutrition Screening Tool    Flowsheet Row Most Recent Value   Recently Lost Weight Without Trying No filed at 10/29/2022 1144   If Yes, How Much Weight Loss Unsure filed at 10/29/2022 1144   Eating Poorly Due to Decreased Appetite No filed at 10/29/2022 1144   MST Score 2 filed at 10/29/2022 [address],

## 2022-10-29 NOTE — H&P
GENERAL GENERIC H&P/CONSULT  Presenting complaint:Headache/confusion    Subjective: 20-year-old male with past medical history of multiple comorbidities presents to [facility] with complaints of headaches as well as confusion. Patient states he was in his usual state of health until earlier this a.m. when he had sudden onset generalized headache as well as an episode of confusion where patient was unaware of his surroundings, subsequent to which patient presented to the ED. The symptoms have since resolved. Patient denies any fevers chills nausea vomiting lightheadedness dizziness dyspnea orthopnea paroxysmal nocturnal dyspnea chest pain palpitations focal weakness loss of sensation auditory or visual symptoms abdominal stool or urinary complaints or any other associated symptoms. Patient endorses no recent sick contacts or travel activity    Past Medical History:   Diagnosis Date    DM (diabetes mellitus) ([facility] [address]     DVT (deep venous thrombosis) ([facility] [address] Right Leg    HTN (hypertension)     PE (pulmonary embolism)     Pneumonia     TIA (transient ischemic attack) 2003      Past Surgical History:   Procedure Laterality Date    CARDIAC SURG PROCEDURE UNLIST      cardiac tumor removed    HX CORONARY ARTERY BYPASS GRAFT  7/20/15      Prior to Admission medications    Medication Sig Start Date End Date Taking? Authorizing Provider   metoprolol tartrate (LOPRESSOR) 25 mg tablet Take 1 Tab by mouth two (2) times a day. 3/14/17  Yes Chriss Haines MD   apixaban (ELIQUIS) 5 mg tablet Take 1 Tab by mouth two (2) times a day. Indications: Pulmonary Thromboembolism 3/14/17  Yes Chriss Haines MD   atorvastatin (LIPITOR) 10 mg tablet Take 1 Tab by mouth nightly. 3/14/17  Yes Chriss Haines MD   metFORMIN (GLUCOPHAGE) 1,000 mg tablet Take 1,000 mg by mouth daily. Yes Provider, Historical   tamsulosin (FLOMAX) 0.4 mg capsule Take  by mouth as needed.  1/13/16  Yes Provider, Historical   therapeutic multivitamin [facility]) tablet Take 1 Tab by mouth daily. Yes Provider, Historical     Allergies   Allergen Reactions    Pcn [Penicillins] Nausea and Vomiting      Social History     Tobacco Use    Smoking status: Never    Smokeless tobacco: Never   Substance Use Topics    Alcohol use: No     Alcohol/week: 0.0 standard drinks      Family History   Problem Relation Age of Onset    Heart Attack Father     Cancer Father         Lung      Review of Systems   Constitutional:  Positive for activity change, appetite change and fatigue. Negative for chills, diaphoresis, fever and unexpected weight change. HENT:  Negative for congestion, dental problem, drooling, ear discharge, ear pain, facial swelling, hearing loss, mouth sores, nosebleeds, postnasal drip, rhinorrhea, sinus pressure, sinus pain, sneezing, sore throat, tinnitus, trouble swallowing and voice change. Eyes:  Negative for photophobia, pain, discharge, redness, itching and visual disturbance. Respiratory:  Negative for apnea, cough, choking, chest tightness, shortness of breath, wheezing and stridor. Cardiovascular:  Negative for chest pain, palpitations and leg swelling. Gastrointestinal:  Negative for abdominal distention, abdominal pain, anal bleeding, blood in stool, constipation, diarrhea, nausea, rectal pain and vomiting. Endocrine: Negative for cold intolerance, heat intolerance, polydipsia, polyphagia and polyuria. Genitourinary:  Negative for decreased urine volume, difficulty urinating, dysuria, enuresis, flank pain, frequency, genital sores, hematuria, penile discharge, penile pain, penile swelling, scrotal swelling, testicular pain and urgency. Musculoskeletal:  Negative for arthralgias, back pain, gait problem, joint swelling, myalgias, neck pain and neck stiffness. Skin:  Negative for color change, pallor, rash and wound. Allergic/Immunologic: Negative for environmental allergies, food allergies and immunocompromised state. Neurological:  Positive for weakness. Negative for dizziness, tremors, seizures, syncope, facial asymmetry, speech difficulty, light-headedness, numbness and headaches. Hematological:  Negative for adenopathy. Does not bruise/bleed easily. Psychiatric/Behavioral:  Negative for agitation, behavioral problems, confusion, decreased concentration, dysphoric mood, hallucinations, self-injury, sleep disturbance and suicidal ideas. The patient is not nervous/anxious and is not hyperactive. Objective:    No intake/output data recorded. No intake/output data recorded. Patient Vitals for the past 8 hrs:   BP Temp Pulse Resp SpO2 Height Weight   10/29/22 0549 -- -- 76 28 98 % -- --   10/29/22 0522 (!) 153/80 -- 76 25 97 % -- --   10/29/22 0449 (!) 156/61 -- 75 19 97 % -- --   10/29/22 0404 (!) 126/113 -- 75 -- -- -- --   10/29/22 0349 (!) 155/74 -- 71 26 97 % -- --   10/29/22 0249 (!) 173/61 -- 70 20 98 % -- --   10/29/22 0158 (!) 180/64 98.1 °F (36.7 °C) 83 18 98 % 6' 2\" (1.88 m) 67.7 kg (149 lb 4 oz)     Physical Exam  Vitals reviewed. Constitutional:       General: He is not in acute distress. Appearance: He is normal weight. He is ill-appearing. He is not toxic-appearing or diaphoretic. HENT:      Head: Normocephalic and atraumatic. Nose: Nose normal. No congestion or rhinorrhea. Mouth/Throat:      Mouth: Mucous membranes are moist.      Pharynx: Oropharynx is clear. No oropharyngeal exudate or posterior oropharyngeal erythema. Eyes:      General: No scleral icterus. Right eye: No discharge. Left eye: No discharge. Extraocular Movements: Extraocular movements intact. Conjunctiva/sclera: Conjunctivae normal.      Pupils: Pupils are equal, round, and reactive to light. Neck:      Vascular: No carotid bruit. Cardiovascular:      Rate and Rhythm: Normal rate and regular rhythm. Pulses: Normal pulses. Heart sounds: Normal heart sounds. No murmur heard. No friction rub. No gallop. Pulmonary:      Effort: Pulmonary effort is normal. No respiratory distress. Breath sounds: Normal breath sounds. No stridor. No wheezing, rhonchi or rales. Chest:      Chest wall: No tenderness. Abdominal:      General: Abdomen is flat. Bowel sounds are normal. There is no distension. Palpations: Abdomen is soft. There is no mass. Tenderness: There is no abdominal tenderness. There is no right CVA tenderness, left CVA tenderness, guarding or rebound. Hernia: No hernia is present. Musculoskeletal:         General: No swelling, tenderness, deformity or signs of injury. Normal range of motion. Cervical back: Normal range of motion and neck supple. No rigidity or tenderness. Right lower leg: No edema. Left lower leg: No edema. Lymphadenopathy:      Cervical: No cervical adenopathy. Skin:     General: Skin is warm. Capillary Refill: Capillary refill takes less than 2 seconds. Coloration: Skin is not jaundiced or pale. Findings: No bruising, erythema, lesion or rash. Neurological:      General: No focal deficit present. Mental Status: He is alert and oriented to person, place, and time. Mental status is at baseline. Cranial Nerves: No cranial nerve deficit. Sensory: No sensory deficit. Motor: No weakness. Coordination: Coordination normal.      Gait: Gait normal.      Deep Tendon Reflexes: Reflexes normal.   Psychiatric:         Mood and Affect: Mood normal.         Behavior: Behavior normal.         Thought Content:  Thought content normal.         Judgment: Judgment normal.        Labs:    Recent Results (from the past 24 hour(s))   GLUCOSE, POC    Collection Time: 10/29/22  2:10 AM   Result Value Ref Range    Glucose (POC) 145 (H) 65 - 117 mg/dL    Performed by Emerita CEDILLO    CBC WITH AUTOMATED DIFF    Collection Time: 10/29/22  2:21 AM   Result Value Ref Range    WBC 6.3 4.1 - 11.1 K/uL    RBC 4. 38 4.10 - 5.70 M/uL    HGB 14.2 12.1 - 17.0 g/dL    HCT 42.9 36.6 - 50.3 %    MCV 97.9 80.0 - 99.0 FL    MCH 32.4 26.0 - 34.0 PG    MCHC 33.1 30.0 - 36.5 g/dL    RDW 13.1 11.5 - 14.5 %    PLATELET 789 894 - 990 K/uL    MPV 8.9 8.9 - 12.9 FL    NRBC 0.0 0  WBC    ABSOLUTE NRBC 0.00 0.00 - 0.01 K/uL    NEUTROPHILS 58 32 - 75 %    LYMPHOCYTES 26 12 - 49 %    MONOCYTES 10 5 - 13 %    EOSINOPHILS 5 0 - 7 %    BASOPHILS 1 0 - 1 %    IMMATURE GRANULOCYTES 0 0.0 - 0.5 %    ABS. NEUTROPHILS 3.7 1.8 - 8.0 K/UL    ABS. LYMPHOCYTES 1.7 0.8 - 3.5 K/UL    ABS. MONOCYTES 0.7 0.0 - 1.0 K/UL    ABS. EOSINOPHILS 0.3 0.0 - 0.4 K/UL    ABS. BASOPHILS 0.1 0.0 - 0.1 K/UL    ABS. IMM. GRANS. 0.0 0.00 - 0.04 K/UL    DF AUTOMATED     METABOLIC PANEL, COMPREHENSIVE    Collection Time: 10/29/22  2:21 AM   Result Value Ref Range    Sodium 139 136 - 145 mmol/L    Potassium 4.4 3.5 - 5.1 mmol/L    Chloride 107 97 - 108 mmol/L    CO2 27 21 - 32 mmol/L    Anion gap 5 5 - 15 mmol/L    Glucose 163 (H) 65 - 100 mg/dL    BUN 18 6 - 20 MG/DL    Creatinine 1.06 0.70 - 1.30 MG/DL    BUN/Creatinine ratio 17 12 - 20      eGFR >60 >60 ml/min/1.73m2    Calcium 9.0 8.5 - 10.1 MG/DL    Bilirubin, total 0.8 0.2 - 1.0 MG/DL    ALT (SGPT) 20 12 - 78 U/L    AST (SGOT) 15 15 - 37 U/L    Alk.  phosphatase 80 45 - 117 U/L    Protein, total 6.9 6.4 - 8.2 g/dL    Albumin 3.5 3.5 - 5.0 g/dL    Globulin 3.4 2.0 - 4.0 g/dL    A-G Ratio 1.0 (L) 1.1 - 2.2     PROTHROMBIN TIME + INR    Collection Time: 10/29/22  2:21 AM   Result Value Ref Range    INR 1.2 (H) 0.9 - 1.1      Prothrombin time 12.2 (H) 9.0 - 11.1 sec   TROPONIN-HIGH SENSITIVITY    Collection Time: 10/29/22  2:21 AM   Result Value Ref Range    Troponin-High Sensitivity 15 0 - 76 ng/L   SAMPLES BEING HELD    Collection Time: 10/29/22  2:21 AM   Result Value Ref Range    SAMPLES BEING HELD RED     COMMENT        Add-on orders for these samples will be processed based on acceptable specimen integrity and analyte stability, which may vary by analyte. EKG, 12 LEAD, INITIAL    Collection Time: 10/29/22  2:37 AM   Result Value Ref Range    Ventricular Rate 77 BPM    Atrial Rate 77 BPM    P-R Interval 180 ms    QRS Duration 124 ms    Q-T Interval 420 ms    QTC Calculation (Bezet) 475 ms    Calculated P Axis 99 degrees    Calculated R Axis -71 degrees    Calculated T Axis 42 degrees    Diagnosis       Sinus rhythm with occasional premature ventricular complexes  Right bundle branch block  Left anterior fascicular block  ** Bifascicular block **  Anterior infarct , age undetermined  When compared with ECG of 09-SEP-2015 10:59,  premature ventricular complexes are now present  (RBBB and left anterior fascicular block) is now present  Anterior infarct is now present         ECG: nonspecific ST and T waves changes   CT head:IMPRESSION  No acute intracranial hemorrhage, mass or infarct. Cerebral atrophy  and white matter change most compatible with chronic small vessel ischemic  and/or senescent change. Intracranial atherosclerosis. Paranasal sinus disease. CTA head and neck:IMPRESSION  1. No acute large vessel occlusion. Atherosclerotic vascular disease without  flow-limiting stenosis as above. 2.  Areas of hypoperfusion in the superior frontal lobes bilaterally most likely  artifactual, though if there is clinical concern for acute infarction consider  MRI. 3. Vague nodular densities superior segment right lower lobe, and interval  enlargement of AP window lymph nodes up to 1.9 cm. This may be better assessed  with nonemergent outpatient CT chest as clinically indicated.     Assessment:  Active Problems:    TIA (transient ischemic attack) (10/29/2022)        Plan:    Transient ischemic attack-patient presents with above-mentioned symptomatology and based on patient's clinical presentation as well as physical examination conjunction with radiographic findings, concerns for transient ischemic attack versus cerebrovascular accident  Aspirin once daily  Continue home statin  Obtain MRI brain for further evaluation  CT angio head and neck reviewed  Physical therapy Occupational Therapy evaluation  Neurology consult further evaluation    Hyperglycemia to type 2 diabetes-insulin sliding scale    Hypertension-continue current antihypertensive medications    History of pulmonary embolism-continue home anticoagulation    Prophylaxis-Eliquis  FEN-cardiac/diabetic diet, replete potassium and magnesium  Full code, will clarify about surrogate decision-maker, admitted for observation and further management    Signed:   Jennifer Norwood MD 10/29/2022

## 2022-10-29 NOTE — PROGRESS NOTES
Speech Pathology Note    Reviewed chart and note patient admitted with headache with concern for CVA. Note CT showed \"No acute intracranial hemorrhage, mass or infarct\" and MRI \"No acute intracranial process. No intracranial mass, hemorrhage or evidence of acute infarction. \" Note patient passed the [facility] screen and a regular diet was ordered. NIHSS=0. Discussed case with RN who reported no SLP-related concerns. Introduced self and role of SLP to patient. Patient denied any decline in motor speech, language, cognitive, or swallowing function, and patient informally observed drinking thin liquids with no difficulty. Patient Ox4. Formal SLP evaluation not clinically indicated at this time. Will sign off. Please re-consult if further needs arise. Thank you.     Karlene Minor M.S., CCC-SLP

## 2022-10-29 NOTE — CONSULTS
Date of Consultation:  October 29, 2022    Referring Physician: Yanet Hernandez MD     Reason for Consultation:  Headache     Chief Complaint   Patient presents with    Headache     Patient reports worsening headache for the past 2wks, states he woke up tonight and it was significantly worse, and that he was having difficulty with memory and orientation. Patient reports starting a new antibiotic one month ago and had COVID at the end of September. History of Present Illness:   Sergio Astudillo is a 80 y.o. male with a history of hypertension, diabetes and prior pulmonary embolus who is currently admitted to the hospital after he developed a headache approximately 1 month ago. Patient reports that he had COVID approximately 4 weeks ago and after that time he noted having a headache that has been progressively worsening. Patient reports that his headaches are bifrontal in nature described as an aching pain. He rates them anywhere between a 4-8 out of 10 in severity. He does report some mild photosensitivity and phonophobia associated with the headache. He does report that the headache occurs mostly in the evening and nighttime and persists throughout the night. He has tried Tylenol without any improvement in his symptoms. He denies any nausea or vomiting. Additionally he does complain of difficulty with sleep due to nightmares that is he is having almost every night. He reports that this also started after getting COVID 19. He reports that these nightmares are very vivid and he felt like his wife was awoken questioner last night. For this reason he was brought to the hospital for further evaluation. Here he underwent work-up including a noncontrast head CT that was unremarkable, CTA head and neck with perfusion which showed no large vessel occlusion or perfusion abnormality. He did have an MRI of the brain with and without contrast which showed no abnormality.   Today on my evaluation patient reports that he continues to have headache. He does report that it is intermittent in nature and at times worsens. His daughter who was at bedside reports that his anxiety has been worsening recently. Additionally we did talk about the atrophy noted on the MRI of the brain. He is not having difficulty with his memory recently. Family is not concerned for dementia at this time. Past Medical History:   Diagnosis Date    DM (diabetes mellitus) ([address]     DVT (deep venous thrombosis) ([facility]) Right Leg    HTN (hypertension)     PE (pulmonary embolism)     Pneumonia     TIA (transient ischemic attack) 2003        Past Surgical History:   Procedure Laterality Date    CARDIAC SURG PROCEDURE UNLIST      cardiac tumor removed    HX CORONARY ARTERY BYPASS GRAFT  7/20/15        Family History   Problem Relation Age of Onset    Heart Attack Father     Cancer Father         Lung        Social History     Tobacco Use    Smoking status: Never    Smokeless tobacco: Never   Substance Use Topics    Alcohol use: No     Alcohol/week: 0.0 standard drinks        Allergies   Allergen Reactions    Pcn [Penicillins] Nausea and Vomiting        Prior to Admission medications    Medication Sig Start Date End Date Taking? Authorizing Provider   doxycycline (MONODOX) 100 mg capsule Take 100 mg by mouth two (2) times a day. Yes Provider, Historical   metoprolol tartrate (LOPRESSOR) 25 mg tablet Take 1 Tab by mouth two (2) times a day. 3/14/17  Yes Marisela Ramirez MD   apixaban (ELIQUIS) 5 mg tablet Take 1 Tab by mouth two (2) times a day. Indications: Pulmonary Thromboembolism 3/14/17  Yes Marisela Ramirez MD   atorvastatin (LIPITOR) 10 mg tablet Take 1 Tab by mouth nightly. 3/14/17  Yes Marisela Ramirez MD   metFORMIN (GLUCOPHAGE) 1,000 mg tablet Take 1,000 mg by mouth daily. Yes Provider, Historical   tamsulosin (FLOMAX) 0.4 mg capsule Take  by mouth as needed.  1/13/16  Yes Provider, Historical   therapeutic multivitamin (THERAGRAN) tablet Take 1 Tab by mouth daily. Yes Provider, Historical       Review of Systems:    General, constitutional: negative  Eyes, vision: negative  Ears, nose, throat: negative  Cardiovascular, heart: negative  Respiratory: negative  Gastrointestinal: negative  Genitourinary: negative  Musculoskeletal: negative  Skin and integumentary: negative  Psychiatric: negative  Endocrine: negative  Neurological: negative, except for HPI  Hematologic/lymphatic: negative  Allergy/immunology: negative    PHYSICAL EXAMINATION:   Visit Vitals  BP (!) 153/80   Pulse 85   Temp 98.1 °F (36.7 °C)   Resp 20   Ht 6' 2\" (1.88 m)   Wt 149 lb 4 oz (67.7 kg)   SpO2 96%   BMI 19.16 kg/m²       Physical Exam:  General:  no acute distress  Neck: no carotid bruits  Lungs: clear to auscultation  Heart:  no murmurs, regular rate and rhythm   Lower extremity: no edema    Neurological exam:  Mental Status: Awake, alert, oriented to person, place and time  Attention and Concentration: able to state the days of the week backwards   Speech and Language: No dysarthria. Able to name, repeat and follow commands   Fund of knowledge was preserved    Cranial nerves: II-XII:  Pupils equal and reactive, visual fields intact by confrontation   Extraocular movements intact, no evidence of nystagmus or ptosis   Facial sensation intact   Facial movements symmetric   Hearing intact to soft rub bilaterally   Shoulder shrug symmetric and strong   Tongue protrusion full and midline without fasciculation or atrophy    Motor:   Normal tone and Bulk   Drift: No evidence of pronator drift     Strength testing:   deltoid triceps biceps Wrist ext. Wrist flex. intrinsics   Right 5 5 5 5 5 5   Left 5 5 5 5 5 5      Hip flex. Hip ext. Knee ext. Knee flex Dorsi flex Plantar flex   Right  5 5 5 5 5 5   Left  5 5 5 5 5 5       Sensory:  Sensation intact to light touch and pinprick. There is no extinction.   Vibration is intact    Reflexes:   Biceps Triceps  Brachiorad Patellar Achilles Plantar Hoffmans   Right  2 2 2 2 2 Down Neg   Left  2 2 2 2 2 Down Neg      Cerebellar testing: Finger-nose-finger was intact    Gait was deferred due to concerns over patient safety    Data:     Lab Results   Component Value Date/Time    Hemoglobin A1c 6.1 09/09/2015 09:39 AM    Sodium 139 10/29/2022 02:21 AM    Potassium 4.4 10/29/2022 02:21 AM    Chloride 107 10/29/2022 02:21 AM    Glucose 163 (H) 10/29/2022 02:21 AM    BUN 18 10/29/2022 02:21 AM    Creatinine 1.06 10/29/2022 02:21 AM    Calcium 9.0 10/29/2022 02:21 AM    WBC 6.3 10/29/2022 02:21 AM    HCT 42.9 10/29/2022 02:21 AM    HGB 14.2 10/29/2022 02:21 AM    PLATELET 300 01/41/0959 02:21 AM       Imaging:    Results from Hospital Encounter encounter on 10/29/22    MRI BRAIN W WO CONT    Narrative  Clinical history: severe headache, 4 wks post COVID, episode of TGA  INDICATION:   severe headache, 4 wks post COVID, episode of TGA    COMPARISON: 10/29/2022  TECHNIQUE: MR examination of the brain includes axial and sagittal T1 , axial  T2, axial FLAIR, axial gradient echo, axial DWI, coronal T1 . Pre and post  contrast axial T1-weighted imaging. Postcontrast T1-weighted imaging coronal  plane. CONTRAST: 20 ml ProHance  FINDINGS:  There is no intracranial mass, hemorrhage or acute infarction. There are minimal scattered foci of increased T2 signal intensity demonstrated  in the corona radiata, centrum semiovale and central faiza. There is right  maxillary sinus disease. There is sulcal and ventricular prominence. Mild  ethmoid sinus disease. There is no abnormal parenchymal enhancement. There is no  abnormal meningeal enhancement demonstrated. The brain architecture is normal.  There is no evidence of midline shift or mass-effect. The ventricles are normal  in size, position and configuration. There are no extra-axial fluid  collections. Major intracranial vascular flow-voids are unremarkable. The orbits  are grossly symmetric.  Dural venous sinuses are grossly unremarkable. There is  no Chiari or syrinx. Pituitary and infundibulum grossly unremarkable. Cerebellopontine angles are unremarkable. No skull base mass is identified. Cavernous sinuses are symmetric. The mastoid air cells and are well pneumatized  and clear. Impression  Mild to moderate K lobar cerebral atrophy. Right maxillary sinus disease. No acute intracranial process. No intracranial mass, hemorrhage or evidence of acute infarction. Results from [place] encounter on 10/29/22    CT CODE NEURO PERF W CBF    FINAL REPORT:    INDICATION: Code Neuro    EXAMINATION:  CT ANGIOGRAPHY HEAD AND NECK    COMPARISON: CT head earlier the same day    TECHNIQUE:  Following the uneventful administration of  intravenous contrast,  axial CT angiography of the head and neck was performed. 3D image  postprocessing was performed. CT dose reduction was achieved through use of a  standardized protocol tailored for this examination and automatic exposure  control for dose modulation. Cerebral perfusion analysis using computed tomography with contrast  administration, including post processing of parametric maps with the  termination of cerebral blood flow, cerebral blood volume, and mean transit  time. This study was analyzed by the [address] ai algorithm. FINDINGS:    CTA NECK    Aortic Arch: Patent. Right Common Carotid Artery: Patent. Right Internal Carotid Artery: Patent. NASCET Right: 0-25%. Left Common Carotid Artery: Patent. Left Internal Carotid Artery: Calcific plaque along the posterior wall of the  origin of the internal carotid artery though without significant luminal  narrowing. NASCET Left: Less than 25%. Carotid stenosis determined using NASCET criteria. Right Vertebral Artery: Patent. Left Vertebral Artery: Patent. Cervical Soft Tissues: No significant abnormality. Lung Apices: Few small ill-defined nodular densities in the superior segment of  the right lower lobe.  AP window lymph nodes measuring up to 1.9 cm previously  1.5 cm. Bones: No destructive bone lesion. Additional Comments: Right maxillary sinus opacification. CTA HEAD    Posterior Circulation: No flow limiting stenosis or occlusion. Anterior Circulation: Calcific atherosclerosis and irregularity bilateral  cavernous and supraclinoid internal carotid artery segments, without  flow-limiting stenosis. Anterior and middle cerebral arteries are patent. Additional Comments: No evidence of aneurysm or vascular malformation. CT PERFUSION:  Areas of hypoperfusion superior frontal lobes bilaterally, likely artifactual.  RCBF < 30% = 11 cc. Tmax > 6 seconds = 16 cc. Mismatch volume = 5 cc. Impression  1. No acute large vessel occlusion. Atherosclerotic vascular disease without  flow-limiting stenosis as above. 2.  Areas of hypoperfusion in the superior frontal lobes bilaterally most likely  artifactual, though if there is clinical concern for acute infarction consider  MRI. 3. Vague nodular densities superior segment right lower lobe, and interval  enlargement of AP window lymph nodes up to 1.9 cm. This may be better assessed  with nonemergent outpatient CT chest as clinically indicated. IMPRESSION/RECOMMENDATIONS:  Herbie Brar is a 80 y.o. male with a history of hypertension, diabetes and prior pulmonary embolus who is currently admitted to the hospital after he developed a headache which does appear to be a tension type headaches. It is possible that these headaches were triggered by his recent COVID-19 infection. He does also complain of insomnia which is likely contributing to the headaches. MRI of the brain was completed which showed no evidence of stroke bleed or tumor. His neurological exam is unremarkable    Headache: Appears to be tension type headaches given the location and description of the pain.   He does not have any migrainous features.  -We will trial Fioricet 1 tablet every 6-8 hours to determine if this helps his symptoms. We did discuss that this is not a long-term option.  -Patient also complains of significant agitation as well as difficulty with sleep, will trial amitriptyline 10 mg at bedtime. I will see if this is anxiety as well as his insomnia. -We discussed the importance of a proper diet including plenty of fruits and vegetables as this can help with headache prevention.  -We discussed the importance of staying hydrated and drinking at least 32 to 64 ounces of water daily as this can be a cause of tension type headaches.  -We also discussed the importance of keeping a headache journal or log to identify triggers.  -We discussed the importance of sleep hygiene and attempting to get at least 6 to 8 hours of sleep daily to help with headache prevention. Atrophy noted on MRI of the brain: Family does not notice any concerns for decline or change in his memory.   -I discussed with the atrophy noted on his brain MRI if he may be at risk for dementia in the future. We did discuss strategies for slowing the progression or reducing the risk of memory problems  -We also discussed the importance of diet and exercise and how this has been shown to slow the progression or reduce the risk of developing neurodegenerative diseases  -We did discuss that he may potentially benefit from neuropsych testing however this can be discussed as an outpatient if warranted    Thank you very much for this consultation, we will continue to follow. Please call with any additional questions.     Margarita Johnson MD

## 2022-10-29 NOTE — PROGRESS NOTES
End of Shift Note    Bedside shift change report given to [place], [place] (oncoming nurse) by Rashid Aguiar RN (offgoing nurse). Report included the following information SBAR, Kardex, and Recent Results    Shift worked:  Days   Shift summary and any significant changes:     New Admission       Concerns for physician to address:  none   Zone phone for oncoming shift:   [id number]     Patient Information  Clive Ledesma  80 y.o.  10/29/2022  2:08 AM by Lillie Padgett MD. 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    CAD (coronary artery disease) 09/09/2015    Atrial myxoma 07/24/2015    Bilateral pulmonary embolism ([facility] 07/20/2015     Past Medical History:   Diagnosis Date    DM (diabetes mellitus) ([facility]     DVT (deep venous thrombosis) ([facility] [address] Right Leg    HTN (hypertension)     PE (pulmonary embolism)     Pneumonia     TIA (transient ischemic attack) 2003       Core Measures:  CVA: No No  CHF:No No  PNA:No No    Activity:  Activity Level: Up ad ron  Number times ambulated in hallways past shift: 0  Number of times OOB to chair past shift: 2    Cardiac:   Cardiac Monitoring: Yes      Cardiac Rhythm: Sinus Rhythm    Access:   Current line(s): PIV   Central Line? No Placement date na Reason Medically Necessary na  PICC LINE? No Placement date na Reason Medically Necessary na    Genitourinary:   Urinary status: voiding  Urinary Catheter? No Placement Date na Reason Medically Necessary na    Respiratory:   O2 Device: None (Room air)  Chronic home O2 use?: NO  Incentive spirometer at bedside: NO       GI:     Current diet:  ADULT DIET Regular; 5 carb choices (75 gm/meal);  Low Fat/Low Chol/High Fiber/CLAUDIA; No Salt Added (3-4 gm)  Passing flatus: YES  Tolerating current diet: YES       Pain Management:   Patient states pain is manageable on current regimen: YES    Skin:  Dann Score: 20  Interventions: PT/OT consult    Patient Safety:  Fall Score:  Total Score: 1  Interventions: bed/chair alarm     @Rollbelt  @dexterity to release roll belt  Yes/No ( must document dexterity  here by stating Yes or No here, otherwise this is a restraint and must follow restraint documentation policy.)    DVT prophylaxis:  DVT prophylaxis Med- Yes  DVT prophylaxis SCD or BRIAN- No     Wounds: (If Applicable)  Wounds- No  Location incision on upper back    Active Consults:  IP CONSULT TO HOSPITALIST  IP CONSULT TO NEUROLOGY    Length of Stay:  Expected LOS: - - -  Actual LOS: 0  Discharge Plan: Yes CATHERINE Avila RN

## 2022-10-29 NOTE — PROGRESS NOTES
PHYSICAL THERAPY EVALUATION WITH DISCHARGE  Patient: Joellen Dunaway (26 y.o. male)  Date: 10/29/2022  Primary Diagnosis: TIA (transient ischemic attack) [G45.9]       Precautions:          ASSESSMENT  Based on the objective data described below, the patient presents with good strength, good functional mobility, steady gait, and good balance following admission for TIA. CT and MRI are negative for acute CVA. Patient reports he feels at his baseline and all symptoms have resolved. He has equal strength. He demonstrates intact balance and gait is at baseline with no deviations noted. Encouraged patient to sit in the chair for all meals and continue ambulating in the room as needed as he has been up ad ron. PT services are not indicated and no needs at discharge. Functional Outcome Measure: The patient scored Total: 53/56 on the [place] Balance Assessment which is indicative of low fall risk. Other factors to consider for discharge: TIA     Further skilled acute physical therapy is not indicated at this time. PLAN :  Recommendation for discharge: (in order for the patient to meet his/her long term goals)  No skilled physical therapy/ follow up rehabilitation needs identified at this time. This discharge recommendation:  A follow-up discussion with the attending provider and/or case management is planned    IF patient discharges home will need the following DME: none         SUBJECTIVE:   Patient stated I feel completely fine.     OBJECTIVE DATA SUMMARY:   HISTORY:    Past Medical History:   Diagnosis Date    DM (diabetes mellitus) ([facility]     DVT (deep venous thrombosis) ([facility] [address] Right Leg    HTN (hypertension)     PE (pulmonary embolism)     Pneumonia     TIA (transient ischemic attack) 2003     Past Surgical History:   Procedure Laterality Date    CARDIAC SURG PROCEDURE UNLIST      cardiac tumor removed    HX CORONARY ARTERY BYPASS GRAFT  7/20/15       Prior level of function: patient is independent and very active at baseline. He lives with his wife. He reports he enjoys cutting down trees. Personal factors and/or comorbidities impacting plan of care: TIA    Home Situation  Home Environment: Private residence  # Steps to Enter: 2  Rails to Enter: Yes  One/Two Story Residence: One story  Living Alone: No  Support Systems: Spouse/Significant Other  Patient Expects to be Discharged to[de-identified] Home  Current DME Used/Available at Home: None    EXAMINATION/PRESENTATION/DECISION MAKING:   Critical Behavior:  Neurologic State: Alert, Appropriate for age  Orientation Level: Appropriate for age, Oriented X4  Cognition: Appropriate decision making, Appropriate for age attention/concentration, Appropriate safety awareness     Hearing: Auditory  Auditory Impairment: Hard of hearing, bilateral  Skin:  intact  Edema: none  Range Of Motion:  AROM: Within functional limits           PROM: Within functional limits           Strength:    Strength: Within functional limits                    Tone & Sensation:   Tone: Normal              Sensation: Intact               Coordination:  Coordination: Within functional limits  Vision:      Functional Mobility:  Bed Mobility:        Sit to Supine: Independent  Scooting: Independent  Transfers:  Sit to Stand: Independent  Stand to Sit: Independent        Bed to Chair: Independent              Balance:   Sitting: Intact; Without support  Standing: Intact; Without support  Ambulation/Gait Training:  Distance (ft): 50 Feet (ft)  Assistive Device: Gait belt  Ambulation - Level of Assistance: Independent     Gait Description (WDL): Exceptions to WDL  Gait Abnormalities: Decreased step clearance              Speed/Flavia: Pace decreased (<100 feet/min)                         Functional Measure  [place] Balance Test:    Sitting to Standin  Standing Unsupported: 4  Sitting with Back Unsupported: 4  Standing to Sittin  Transfers: 4  Standing Unsupported with Eyes Closed: 4  Standing Unsupported with Feet Together: 4  Reach Forward with Outstretched Arm: 4   Object: 4  Turn to Look Over Shoulders: 4  Turn 360 Degrees: 4  Alternate Foot on Step/Stool: 4  Standing Unsupported One Foot in Front: 3  Stand on One Le  Total: 53/56         56=Maximum possible score;   0-20=High fall risk  21-40=Moderate fall risk   41-56=Low fall risk        Physical Therapy Evaluation Charge Determination   History Examination Presentation Decision-Making   MEDIUM  Complexity : 1-2 comorbidities / personal factors will impact the outcome/ POC  MEDIUM Complexity : 3 Standardized tests and measures addressing body structure, function, activity limitation and / or participation in recreation  MEDIUM Complexity : Evolving with changing characteristics  Other outcome measures [place]  LOW       Based on the above components, the patient evaluation is determined to be of the following complexity level: LOW     Pain Rating:  Denies any pain    Activity Tolerance: WNL    After treatment patient left in no apparent distress:   Supine in bed, Call bell within reach, and Caregiver / family present    COMMUNICATION/EDUCATION:   The patients plan of care was discussed with: Occupational therapist and Registered nurse. Patient's symptoms have resolved. CT and MRI are negative for acute CVA. Patient and/or family was verbally educated on the BE FAST acronym for signs/symptoms of CVA and TIA. BE FAST was written on patient's communication board  for visual education and reinforcement. All questions answered with patient indicating good understanding. Fall prevention education was provided and the patient/caregiver indicated understanding., Patient/family have participated as able in goal setting and plan of care. , and Patient/family agree to work toward stated goals and plan of care.     Thank you for this referral.  Leandro Salguero, PT, DPT   Time Calculation: 9 mins

## 2022-10-29 NOTE — PROGRESS NOTES
OCCUPATIONAL THERAPY EVALUATION/DISCHARGE  Patient: Natalee Wilson (89 y.o. male)  Date: 10/29/2022  Primary Diagnosis: TIA (transient ischemic attack) [G45.9]       Precautions: universal       ASSESSMENT  Based on the objective data described below, the patient presents with ability to complete ADL tasks at indep level s/p admission for HA and acute confusion. Nursing cleared for therapy. CT and MRI negative for acute CVA. Patient reports improvement with HA and agreeable for therapy. He denies any changes in vision, BUE symmetrical AROM/strength, balance, and mobility. Kirk Mons 66/66. He ambulated to the bathroom without AD at independent level. Ed on BE FAST with patient and family with excellent understanding. Current Level of Function (ADLs/self-care): [facility]    Functional Outcome Measure: The patient scored Total A-D  Total A-D (Motor Function): 66/66 on the Fugl-Damian Assessment which is indicative of no impairment in upper extremity functional status. Other factors to consider for discharge: Patient at baseline, no concerns with ADL tasks. PLAN :  Recommendation for discharge: (in order for the patient to meet his/her long term goals)  No skilled occupational therapy/ follow up rehabilitation needs identified at this time. This discharge recommendation:  Has been made in collaboration with the attending provider and/or case management    IF patient discharges home will need the following DME: patient owns DME required for discharge       SUBJECTIVE:   Patient stated I seem to be doing better.     OBJECTIVE DATA SUMMARY:   HISTORY:   Past Medical History:   Diagnosis Date    DM (diabetes mellitus) ([facility] [address]     DVT (deep venous thrombosis) ([facility] [address] Right Leg    HTN (hypertension)     PE (pulmonary embolism)     Pneumonia     TIA (transient ischemic attack) 2003     Past Surgical History:   Procedure Laterality Date    CARDIAC SURG PROCEDURE UNLIST      cardiac tumor removed    HX CORONARY ARTERY BYPASS GRAFT  7/20/15       Prior Level of Function/Environment/Context: Home with wife. Indep with ADL tasks and IADLs. Active with yard work. Expanded or extensive additional review of patient history:     Home Situation  Home Environment: Private residence  # Steps to Enter: 2  Rails to Enter: Yes  One/Two Story Residence: One story  Living Alone: No  Support Systems: Spouse/Significant Other  Patient Expects to be Discharged to[de-identified] Home  Current DME Used/Available at Home: Grab bars  Tub or Shower Type: Shower    Hand dominance: Right    EXAMINATION OF PERFORMANCE DEFICITS:  Cognitive/Behavioral Status:  Neurologic State: Alert; Appropriate for age              Hearing: Auditory  Auditory Impairment: Hard of hearing, bilateral    Vision/Perceptual:                      Diplopia: No    Acuity: Within Defined Limits;Able to read clock/calendar on wall without difficulty; Able to read employee name badge without difficulty         Range of Motion:  AROM: Within functional limits  PROM: Within functional limits                      Strength:  Strength: Within functional limits                Coordination:  Coordination: Within functional limits  Fine Motor Skills-Upper: Left Intact; Right Intact    Gross Motor Skills-Upper: Left Intact; Right Intact    Tone & Sensation:  Tone: Normal  Sensation: Intact                      Balance:  Sitting: Intact; Without support  Standing: Intact; Without support    Functional Mobility and Transfers for ADLs:  Bed Mobility:  Sit to Supine: Independent  Scooting: Independent    Transfers:  Sit to Stand: Independent  Stand to Sit: Independent  Bed to Chair: Independent  Bathroom Mobility: Independent  Toilet Transfer : Independent    ADL Assessment:  Feeding: Independent    Oral Facial Hygiene/Grooming: Independent    Bathing: Independent         Upper Body Dressing: Independent    Lower Body Dressing: Independent    Toileting: Independent                ADL Intervention and task modifications:     Patient instructed and indicated understanding the benefits of maintaining activity tolerance, functional mobility, and independence with self care tasks during acute stay  to ensure safe return home and to baseline. Encouraged patient to increase frequency and duration OOB, be out of bed for all meals, perform daily ADLs (as approved by RN/MD regarding bathing etc), and performing functional mobility to/from bathroom. Provided education with patient on fall prevention for hospital and at home. This includes not getting OOB/chair/toilet without staff assistance, good lighting, good footwear, and recommended AD use. Patient with good understanding. Lower Body Dressing Assistance  Socks: Independent              Functional Measure:  Fugl-Damian Assessment of Motor Recovery after Stroke:        Reflex Activity  Flexors/Biceps/Fingers: Can be elicited  Extensors/Triceps: Can be elicited  Reflex Subtotal: 4    Volitional Movement Within Synergies  Shoulder Retraction: Full  Shoulder Elevation: Full  Shoulder Abduction (90 degrees): Full  Shoulder External Rotation: Full  Elbow Flexion: Full  Forearm Supination: Full  Shoulder Adduction/Internal Rotation: Full  Elbow Extension: Full  Forearm Pronation: Full  Subtotal: 18    Volitional Movement Mixing Synergies  Hand to Lumbar Spine: Full  Shoulder Flexion (0-90 degrees): Full  Pronation-Supination: Full  Subtotal: 6    Volitional Movement With Little or No Synergy  Shoulder Abduction (0-90 degrees): Full  Shoulder Flexion ( degrees): Full  Pronation/Supination: Full  Subtotal : 6    Normal Reflex Activity  Biceps, Triceps, Finger Flexors:  Full  Subtotal : 2    Upper Extremity Total   Upper Extremity Total: 36    Wrist  Stability at 15 Degree Dorsiflexion: Full  Repeated Dorsiflexion/ Volar Flexion: Full  Stability at 15 Degree Dorsiflexion: Full  Repeated Dorsiflexion/ Volar Flexion: Full  Circumduction: Full  Wrist Total: 10    Hand  Mass Flexion: Full  Mass Extension: Full  Grasp A: Full  Grasp B: Full  Grasp C: Full  Grasp D: Full  Grasp E: Full  Hand Total: 14    Coordination/Speed  Tremor: None  Dysmetria: None  Time: <1s  Coordination/Speed Total : 6    Total A-D  Total A-D (Motor Function): 66/66     This is a reliable/valid measure of arm function after a neurological event. It has established value to characterize functional status and for measuring spontaneous and therapy-induced recovery; tests proximal and distal motor functions. Fugl-Damian Assessment - UE scores recorded between five and 30 days post neurologic event can be used to predict UE recovery at six months post neurologic event. Severe = 0-21 points   Moderately Severe = 22-33 points   Moderate = 34-47 points   Mild = 48-66 points  MICHELLE Machuca, BETH De La Cruz, & ALEKSANDR Olvera (1992). Measurement of motor recovery after stroke: Outcome assessment and sample size requirements.  Stroke, 23, pp. 9858-5462.   ------------------------------------------------------------------------------------------------------------------------------------------------------------------  MCID:  Stroke:   Brenda Miles et al, 2001; n = 171; mean age 79 (6) years; assessed within 16 (12) days of stroke, Acute Stroke)  FMA Motor Scores from Admission to Discharge   10 point increase in FMA Upper Extremity = 1.5 change in discharge FIM   10 point increase in FMA Lower Extremity = 1.9 change in discharge FIM  MDC:   Stroke:   Caryl Gamboa et al, 2008, n = 14, mean age = 59.9 (14.6) years, assessed on average 14 (6.5) months post stroke, Chronic Stroke)   FMA = 5.2 points for the Upper Extremity portion of the assessment     Occupational Therapy Evaluation Charge Determination   History Examination Decision-Making   LOW Complexity : Brief history review  LOW Complexity : 1-3 performance deficits relating to physical, cognitive , or psychosocial skils that result in activity limitations and / or participation restrictions  LOW Complexity : No comorbidities that affect functional and no verbal or physical assistance needed to complete eval tasks       Based on the above components, the patient evaluation is determined to be of the following complexity level: LOW   Pain Rating:  Mild HA    Activity Tolerance:   Good    After treatment patient left in no apparent distress:    Sitting in chair, Call bell within reach, and Caregiver / family present    COMMUNICATION/EDUCATION:   The patients plan of care was discussed with: Physical therapist and Registered nurse. Patient was educated regarding his deficit(s) of HA and confusion as this relates to his diagnosis of CVA work up. He demonstrated Excellent understanding as evidenced by resolution of symptoms, high PLOF, and family support. Patient and/or family was verbally educated on the BE FAST acronym for signs/symptoms of CVA and TIA. BE FAST was written on patient's communication board  for visual education and reinforcement. All questions answered with patient indicating good understanding.      Thank you for this referral.  Mikhail Prescott OT  Time Calculation: 20 mins

## 2022-10-30 VITALS
HEART RATE: 93 BPM | RESPIRATION RATE: 16 BRPM | OXYGEN SATURATION: 94 % | SYSTOLIC BLOOD PRESSURE: 132 MMHG | TEMPERATURE: 98.3 F | BODY MASS INDEX: 19.15 KG/M2 | DIASTOLIC BLOOD PRESSURE: 73 MMHG | HEIGHT: 74 IN | WEIGHT: 149.25 LBS

## 2022-10-30 LAB
ALBUMIN SERPL-MCNC: 3.3 G/DL (ref 3.5–5)
ALBUMIN/GLOB SERPL: 1.1 {RATIO} (ref 1.1–2.2)
ALP SERPL-CCNC: 68 U/L (ref 45–117)
ALT SERPL-CCNC: 17 U/L (ref 12–78)
ANION GAP SERPL CALC-SCNC: 5 MMOL/L (ref 5–15)
AST SERPL-CCNC: 10 U/L (ref 15–37)
BASOPHILS # BLD: 0.1 K/UL (ref 0–0.1)
BASOPHILS NFR BLD: 1 % (ref 0–1)
BILIRUB SERPL-MCNC: 0.8 MG/DL (ref 0.2–1)
BUN SERPL-MCNC: 19 MG/DL (ref 6–20)
BUN/CREAT SERPL: 21 (ref 12–20)
CALCIUM SERPL-MCNC: 8.7 MG/DL (ref 8.5–10.1)
CHLORIDE SERPL-SCNC: 108 MMOL/L (ref 97–108)
CHOLEST SERPL-MCNC: 94 MG/DL
CO2 SERPL-SCNC: 27 MMOL/L (ref 21–32)
CREAT SERPL-MCNC: 0.9 MG/DL (ref 0.7–1.3)
DIFFERENTIAL METHOD BLD: ABNORMAL
EOSINOPHIL # BLD: 0.2 K/UL (ref 0–0.4)
EOSINOPHIL NFR BLD: 3 % (ref 0–7)
ERYTHROCYTE [DISTWIDTH] IN BLOOD BY AUTOMATED COUNT: 13 % (ref 11.5–14.5)
EST. AVERAGE GLUCOSE BLD GHB EST-MCNC: 189 MG/DL
GLOBULIN SER CALC-MCNC: 3 G/DL (ref 2–4)
GLUCOSE BLD STRIP.AUTO-MCNC: 172 MG/DL (ref 65–117)
GLUCOSE BLD STRIP.AUTO-MCNC: 291 MG/DL (ref 65–117)
GLUCOSE SERPL-MCNC: 144 MG/DL (ref 65–100)
HBA1C MFR BLD: 8.2 % (ref 4–5.6)
HCT VFR BLD AUTO: 39.3 % (ref 36.6–50.3)
HDLC SERPL-MCNC: 45 MG/DL
HDLC SERPL: 2.1 {RATIO} (ref 0–5)
HGB BLD-MCNC: 13.1 G/DL (ref 12.1–17)
IMM GRANULOCYTES # BLD AUTO: 0 K/UL (ref 0–0.04)
IMM GRANULOCYTES NFR BLD AUTO: 0 % (ref 0–0.5)
INR PPP: 1.2 (ref 0.9–1.1)
LDLC SERPL CALC-MCNC: 39.2 MG/DL (ref 0–100)
LYMPHOCYTES # BLD: 1.4 K/UL (ref 0.8–3.5)
LYMPHOCYTES NFR BLD: 18 % (ref 12–49)
MCH RBC QN AUTO: 32 PG (ref 26–34)
MCHC RBC AUTO-ENTMCNC: 33.3 G/DL (ref 30–36.5)
MCV RBC AUTO: 96.1 FL (ref 80–99)
MONOCYTES # BLD: 0.8 K/UL (ref 0–1)
MONOCYTES NFR BLD: 11 % (ref 5–13)
NEUTS SEG # BLD: 5.3 K/UL (ref 1.8–8)
NEUTS SEG NFR BLD: 67 % (ref 32–75)
NRBC # BLD: 0 K/UL (ref 0–0.01)
NRBC BLD-RTO: 0 PER 100 WBC
PLATELET # BLD AUTO: 192 K/UL (ref 150–400)
PMV BLD AUTO: 8.8 FL (ref 8.9–12.9)
POTASSIUM SERPL-SCNC: 3.8 MMOL/L (ref 3.5–5.1)
PROT SERPL-MCNC: 6.3 G/DL (ref 6.4–8.2)
PROTHROMBIN TIME: 12.2 SEC (ref 9–11.1)
RBC # BLD AUTO: 4.09 M/UL (ref 4.1–5.7)
SERVICE CMNT-IMP: ABNORMAL
SERVICE CMNT-IMP: ABNORMAL
SODIUM SERPL-SCNC: 140 MMOL/L (ref 136–145)
TRIGL SERPL-MCNC: 49 MG/DL (ref ?–150)
VLDLC SERPL CALC-MCNC: 9.8 MG/DL
WBC # BLD AUTO: 7.7 K/UL (ref 4.1–11.1)

## 2022-10-30 PROCEDURE — 74011636637 HC RX REV CODE- 636/637: Performed by: INTERNAL MEDICINE

## 2022-10-30 PROCEDURE — 80061 LIPID PANEL: CPT

## 2022-10-30 PROCEDURE — 85610 PROTHROMBIN TIME: CPT

## 2022-10-30 PROCEDURE — 82962 GLUCOSE BLOOD TEST: CPT

## 2022-10-30 PROCEDURE — 83036 HEMOGLOBIN GLYCOSYLATED A1C: CPT

## 2022-10-30 PROCEDURE — G0378 HOSPITAL OBSERVATION PER HR: HCPCS

## 2022-10-30 PROCEDURE — 99215 OFFICE O/P EST HI 40 MIN: CPT | Performed by: PSYCHIATRY & NEUROLOGY

## 2022-10-30 PROCEDURE — 36415 COLL VENOUS BLD VENIPUNCTURE: CPT

## 2022-10-30 PROCEDURE — 74011250637 HC RX REV CODE- 250/637: Performed by: INTERNAL MEDICINE

## 2022-10-30 PROCEDURE — 85025 COMPLETE CBC W/AUTO DIFF WBC: CPT

## 2022-10-30 PROCEDURE — 80053 COMPREHEN METABOLIC PANEL: CPT

## 2022-10-30 RX ORDER — ACETAMINOPHEN 325 MG/1
650 TABLET ORAL
Qty: 60 TABLET | Refills: 0 | Status: SHIPPED | OUTPATIENT
Start: 2022-10-30

## 2022-10-30 RX ORDER — AMITRIPTYLINE HYDROCHLORIDE 10 MG/1
10 TABLET, FILM COATED ORAL
Qty: 30 TABLET | Refills: 0 | Status: SHIPPED | OUTPATIENT
Start: 2022-10-30 | End: 2022-11-22

## 2022-10-30 RX ORDER — GUAIFENESIN 100 MG/5ML
81 LIQUID (ML) ORAL DAILY
Qty: 30 TABLET | Refills: 0 | Status: SHIPPED | OUTPATIENT
Start: 2022-10-31 | End: 2022-11-22

## 2022-10-30 RX ORDER — BUTALBITAL, ACETAMINOPHEN AND CAFFEINE 50; 325; 40 MG/1; MG/1; MG/1
1 TABLET ORAL
Qty: 120 TABLET | Refills: 0 | Status: SHIPPED | OUTPATIENT
Start: 2022-10-30 | End: 2022-11-22

## 2022-10-30 RX ORDER — LANOLIN ALCOHOL/MO/W.PET/CERES
6 CREAM (GRAM) TOPICAL
Qty: 60 TABLET | Refills: 0 | Status: SHIPPED | OUTPATIENT
Start: 2022-10-30 | End: 2022-11-22

## 2022-10-30 RX ADMIN — THERA TABS 1 TABLET: TAB at 08:39

## 2022-10-30 RX ADMIN — APIXABAN 5 MG: 5 TABLET, FILM COATED ORAL at 08:39

## 2022-10-30 RX ADMIN — Medication 2 UNITS: at 08:40

## 2022-10-30 RX ADMIN — Medication 2 UNITS: at 12:08

## 2022-10-30 RX ADMIN — ASPIRIN 81 MG: 81 TABLET, CHEWABLE ORAL at 08:40

## 2022-10-30 RX ADMIN — METOPROLOL TARTRATE 25 MG: 25 TABLET, FILM COATED ORAL at 08:39

## 2022-10-30 RX ADMIN — TAMSULOSIN HYDROCHLORIDE 0.4 MG: 0.4 CAPSULE ORAL at 08:39

## 2022-10-30 NOTE — PROGRESS NOTES
Problem: Patient Education: Go to Patient Education Activity  Goal: Patient/Family Education  10/30/2022 0016 by Gonsalo Livingston RN  Outcome: Progressing Towards Goal  10/29/2022 2331 by Gonsalo Livingston RN  Outcome: Progressing Towards Goal     Problem: TIA/CVA Stroke: 0-24 hours  Goal: Off Pathway (Use only if patient is Off Pathway)  10/30/2022 0016 by Gonsalo Livingston RN  Outcome: Progressing Towards Goal  10/29/2022 2331 by Gonsalo Livingston RN  Outcome: Progressing Towards Goal  Goal: Activity/Safety  10/30/2022 0016 by Gonsalo Livingston RN  Outcome: Progressing Towards Goal  10/29/2022 2331 by Gonsalo Livingston RN  Outcome: Progressing Towards Goal  Goal: Consults, if ordered  10/30/2022 0016 by Gonsalo Livingston RN  Outcome: Progressing Towards Goal  10/29/2022 2331 by Gonsalo Livingston RN  Outcome: Progressing Towards Goal  Goal: Diagnostic Test/Procedures  10/30/2022 0016 by Gonsalo Livingston RN  Outcome: Progressing Towards Goal  10/29/2022 2331 by Gonsalo Livingston RN  Outcome: Progressing Towards Goal  Goal: Nutrition/Diet  10/30/2022 0016 by Gonsalo Livingston RN  Outcome: Progressing Towards Goal  10/29/2022 2331 by Gonsalo Livingston RN  Outcome: Progressing Towards Goal  Goal: Discharge Planning  10/30/2022 0016 by Gonsalo Livingston RN  Outcome: Progressing Towards Goal  10/29/2022 2331 by Gonsalo Livingston RN  Outcome: Progressing Towards Goal  Goal: Medications  10/30/2022 0016 by Gonsalo Livingston RN  Outcome: Progressing Towards Goal  10/29/2022 2331 by Gonsalo Livingston RN  Outcome: Progressing Towards Goal  Goal: Respiratory  10/30/2022 0016 by Gonsalo Livingston RN  Outcome: Progressing Towards Goal  10/29/2022 2331 by Gonsalo Livingston RN  Outcome: Progressing Towards Goal  Goal: Treatments/Interventions/Procedures  10/30/2022 0016 by Gonsalo Livingston RN  Outcome: Progressing Towards Goal  10/29/2022 2331 by Gonsalo Livingston RN  Outcome: Progressing Towards Goal  Goal: Minimize risk of bleeding post-thrombolytic infusion  10/30/2022 0016 by Kathy Segura RN  Outcome: Progressing Towards Goal  10/29/2022 2331 by Kathy Segura RN  Outcome: Progressing Towards Goal  Goal: Monitor for complications post-thrombolytic infusion  10/30/2022 0016 by Kathy Segura RN  Outcome: Progressing Towards Goal  10/29/2022 2331 by Kathy Segura RN  Outcome: Progressing Towards Goal  Goal: Psychosocial  10/30/2022 0016 by Kathy Segura RN  Outcome: Progressing Towards Goal  10/29/2022 2331 by Kathy Segura RN  Outcome: Progressing Towards Goal  Goal: *Hemodynamically stable  10/30/2022 0016 by Kathy Segura RN  Outcome: Progressing Towards Goal  10/29/2022 2331 by Kathy Segura RN  Outcome: Progressing Towards Goal  Goal: *Neurologically stable  Description: Absence of additional neurological deficits    10/30/2022 0016 by Kathy Segura RN  Outcome: Progressing Towards Goal  10/29/2022 2331 by Kathy Segura RN  Outcome: Progressing Towards Goal  Goal: *Verbalizes anxiety and depression are reduced or absent  10/30/2022 0016 by Kathy Segura RN  Outcome: Progressing Towards Goal  10/29/2022 2331 by Kathy Segura RN  Outcome: Progressing Towards Goal  Goal: *Absence of Signs of Aspiration on Current Diet  10/30/2022 0016 by Kathy Segura RN  Outcome: Progressing Towards Goal  10/29/2022 2331 by Kathy Segura RN  Outcome: Progressing Towards Goal  Goal: *Absence of deep venous thrombosis signs and symptoms(Stroke Metric)  10/30/2022 0016 by Kathy Segura RN  Outcome: Progressing Towards Goal  10/29/2022 2331 by Kathy Segura RN  Outcome: Progressing Towards Goal  Goal: *Ability to perform ADLs and demonstrates progressive mobility and function  10/30/2022 0016 by Kathy Segura RN  Outcome: Progressing Towards Goal  10/29/2022 2331 by Kathy Segura RN  Outcome: Progressing Towards Goal  Goal: *Stroke education started(Stroke Metric)  10/30/2022 0016 by Jens Tafoya, RN  Outcome: Progressing Towards Goal  10/29/2022 2331 by Jens Tafoya, RN  Outcome: Progressing Towards Goal  Goal: *Dysphagia screen performed(Stroke Metric)  10/30/2022 0016 by Jens Tafoya, RN  Outcome: Progressing Towards Goal  10/29/2022 2331 by Jens Tafoya, RN  Outcome: Progressing Towards Goal  Goal: *Rehab consulted(Stroke Metric)  10/30/2022 0016 by Jens Tafoya, RN  Outcome: Progressing Towards Goal  10/29/2022 2331 by Jens Tafoya, RN  Outcome: Progressing Towards Goal     Problem: TIA/CVA Stroke: Day 2 Until Discharge  Goal: Off Pathway (Use only if patient is Off Pathway)  10/30/2022 0016 by Jens Tafoya, RN  Outcome: Progressing Towards Goal  10/29/2022 2331 by Jens Tafoya, RN  Outcome: Progressing Towards Goal  Goal: Activity/Safety  10/30/2022 0016 by Jens Tafoya, RN  Outcome: Progressing Towards Goal  10/29/2022 2331 by Jens Tafoya, RN  Outcome: Progressing Towards Goal  Goal: Diagnostic Test/Procedures  10/30/2022 0016 by Jens Tafoya, RN  Outcome: Progressing Towards Goal  10/29/2022 2331 by Jens Tafoya, RN  Outcome: Progressing Towards Goal  Goal: Nutrition/Diet  10/30/2022 0016 by Jens Tafoya, RN  Outcome: Progressing Towards Goal  10/29/2022 2331 by Jens Tafoya, RN  Outcome: Progressing Towards Goal  Goal: Discharge Planning  10/30/2022 0016 by Jens Tafoya, RN  Outcome: Progressing Towards Goal  10/29/2022 2331 by Jens Tafoya, RN  Outcome: Progressing Towards Goal  Goal: Medications  10/30/2022 0016 by Jens Tafoya, RN  Outcome: Progressing Towards Goal  10/29/2022 2331 by Jens Tafoya, RN  Outcome: Progressing Towards Goal  Goal: Respiratory  10/30/2022 0016 by Jens Tafoya, RN  Outcome: Progressing Towards Goal  10/29/2022 2331 by Jens Tafoya, RN  Outcome: Progressing Towards Goal  Goal: Treatments/Interventions/Procedures  10/30/2022 0016 by Juli Perales RN  Outcome: Progressing Towards Goal  10/29/2022 2331 by Juli Perales RN  Outcome: Progressing Towards Goal  Goal: Psychosocial  10/30/2022 0016 by Juli Perales RN  Outcome: Progressing Towards Goal  10/29/2022 2331 by Juli Perales RN  Outcome: Progressing Towards Goal  Goal: *Verbalizes anxiety and depression are reduced or absent  10/30/2022 0016 by Juli Perales RN  Outcome: Progressing Towards Goal  10/29/2022 2331 by Juli Perales RN  Outcome: Progressing Towards Goal  Goal: *Absence of aspiration  10/30/2022 0016 by Juli Perales RN  Outcome: Progressing Towards Goal  10/29/2022 2331 by Juli Perales RN  Outcome: Progressing Towards Goal  Goal: *Absence of deep venous thrombosis signs and symptoms(Stroke Metric)  10/30/2022 0016 by Juli Perales RN  Outcome: Progressing Towards Goal  10/29/2022 2331 by Juli Perales RN  Outcome: Progressing Towards Goal  Goal: *Optimal pain control at patient's stated goal  10/30/2022 0016 by Juli Perales RN  Outcome: Progressing Towards Goal  10/29/2022 2331 by Juli Perales RN  Outcome: Progressing Towards Goal  Goal: *Tolerating diet  10/30/2022 0016 by Juli Perales RN  Outcome: Progressing Towards Goal  10/29/2022 2331 by Juli Perales RN  Outcome: Progressing Towards Goal  Goal: *Ability to perform ADLs and demonstrates progressive mobility and function  10/30/2022 0016 by Juli Perales RN  Outcome: Progressing Towards Goal  10/29/2022 2331 by Juli Perales RN  Outcome: Progressing Towards Goal  Goal: *Stroke education continued(Stroke Metric)  10/30/2022 0016 by Juli Perales RN  Outcome: Progressing Towards Goal  10/29/2022 2331 by Juli Perales RN  Outcome: Progressing Towards Goal     Problem: Ischemic Stroke: Discharge Outcomes  Goal: *Verbalizes anxiety and depression are reduced or absent  10/30/2022 0016 by Abel Cain RN  Outcome: Progressing Towards Goal  10/29/2022 2331 by Abel Cain RN  Outcome: Progressing Towards Goal  Goal: *Verbalize understanding of risk factor modification(Stroke Metric)  10/30/2022 0016 by Abel Cain RN  Outcome: Progressing Towards Goal  10/29/2022 2331 by Abel Cain RN  Outcome: Progressing Towards Goal  Goal: *Hemodynamically stable  10/30/2022 0016 by Abel Cain RN  Outcome: Progressing Towards Goal  10/29/2022 2331 by Abel Cain RN  Outcome: Progressing Towards Goal  Goal: *Absence of aspiration pneumonia  10/30/2022 0016 by Abel Cain RN  Outcome: Progressing Towards Goal  10/29/2022 2331 by Abel Cain RN  Outcome: Progressing Towards Goal  Goal: *Aware of needed dietary changes  10/30/2022 0016 by Abel Cain RN  Outcome: Progressing Towards Goal  10/29/2022 2331 by Abel Cain RN  Outcome: Progressing Towards Goal  Goal: *Verbalize understanding of prescribed medications including anti-coagulants, anti-lipid, and/or anti-platelets(Stroke Metric)  10/30/2022 0016 by Abel Cain RN  Outcome: Progressing Towards Goal  10/29/2022 2331 by Abel Cain RN  Outcome: Progressing Towards Goal  Goal: *Tolerating diet  10/30/2022 0016 by Abel Cain RN  Outcome: Progressing Towards Goal  10/29/2022 2331 by Abel Cain RN  Outcome: Progressing Towards Goal  Goal: *Aware of follow-up diagnostics related to anticoagulants  10/30/2022 0016 by Abel Cain RN  Outcome: Progressing Towards Goal  10/29/2022 2331 by Abel Cain RN  Outcome: Progressing Towards Goal  Goal: *Ability to perform ADLs and demonstrates progressive mobility and function  10/30/2022 0016 by Abel Cain RN  Outcome: Progressing Towards Goal  10/29/2022 2331 by Abel Cain RN  Outcome: Progressing Towards Goal  Goal: *Absence of DVT(Stroke Metric)  10/30/2022 0016 by Abel Cain RN  Outcome: Progressing Towards Goal  10/29/2022 2331 by Rebecca Muniz RN  Outcome: Progressing Towards Goal  Goal: *Absence of aspiration  10/30/2022 0016 by Rebecca Muniz RN  Outcome: Progressing Towards Goal  10/29/2022 2331 by Rebecca Muniz RN  Outcome: Progressing Towards Goal  Goal: *Optimal pain control at patient's stated goal  10/30/2022 0016 by Rebecca Muniz RN  Outcome: Progressing Towards Goal  10/29/2022 2331 by Rebecca Muniz RN  Outcome: Progressing Towards Goal  Goal: *Home safety concerns addressed  10/30/2022 0016 by Rebecca Muniz RN  Outcome: Progressing Towards Goal  10/29/2022 2331 by Rebecca Muniz RN  Outcome: Progressing Towards Goal  Goal: *Describes available resources and support systems  10/30/2022 0016 by Rebecca Muniz RN  Outcome: Progressing Towards Goal  10/29/2022 2331 by Rebecca Muniz RN  Outcome: Progressing Towards Goal  Goal: *Verbalizes understanding of activation of EMS(911) for stroke symptoms(Stroke Metric)  10/30/2022 0016 by Rebecca Muniz RN  Outcome: Progressing Towards Goal  10/29/2022 2331 by Rebecca Muniz RN  Outcome: Progressing Towards Goal  Goal: *Understands and describes signs and symptoms to report to providers(Stroke Metric)  10/30/2022 0016 by Rebecca Muniz RN  Outcome: Progressing Towards Goal  10/29/2022 2331 by Rebecca Muniz RN  Outcome: Progressing Towards Goal  Goal: *Neurolgocially stable (absence of additional neurological deficits)  10/30/2022 0016 by Rebecca Muniz RN  Outcome: Progressing Towards Goal  10/29/2022 2331 by Rebecca Muniz RN  Outcome: Progressing Towards Goal  Goal: Db Mcdonough importance of follow-up with primary care physician(Stroke Metric)  10/30/2022 0016 by Rebecca Muniz RN  Outcome: Progressing Towards Goal  10/29/2022 2331 by Rebecca Muniz RN  Outcome: Progressing Towards Goal  Goal: *Smoking cessation discussed,if applicable(Stroke Metric)  10/30/2022 0016 by Hank Mustafa RN  Outcome: Progressing Towards Goal  10/29/2022 2331 by Hank Mustafa RN  Outcome: Progressing Towards Goal  Goal: *Depression screening completed(Stroke Metric)  10/30/2022 0016 by Hank Mustafa RN  Outcome: Progressing Towards Goal  10/29/2022 2331 by Hank Mustafa RN  Outcome: Progressing Towards Goal

## 2022-10-30 NOTE — DISCHARGE SUMMARY
Hospitalist Discharge Summary     Patient ID:  Mike Horton  [id number]  11 y.o.  1940  10/29/2022    PCP on record: Shannen Kyle MD    Admit date: 10/29/2022  Discharge date and time: 10/30/2022    DISCHARGE DIAGNOSIS:    Headache/atypical migraine/hyperglycemia type 2 diabetes/hypertension/history of pulmonary embolism    CONSULTATIONS:  IP CONSULT TO HOSPITALIST  IP CONSULT TO NEUROLOGY    Excerpted HPI from H&P of Ap Boogie MD:  80-year-old male with past medical history of multiple comorbidities presents to [facility] with complaints of headaches as well as confusion. Patient states he was in his usual state of health until earlier this a.m. when he had sudden onset generalized headache as well as an episode of confusion where patient was unaware of his surroundings, subsequent to which patient presented to the ED. The symptoms have since resolved. Patient denies any fevers chills nausea vomiting lightheadedness dizziness dyspnea orthopnea paroxysmal nocturnal dyspnea chest pain palpitations focal weakness loss of sensation auditory or visual symptoms abdominal stool or urinary complaints or any other associated symptoms. Patient endorses no recent sick contacts or travel activity    ______________________________________________________________________  DISCHARGE SUMMARY/HOSPITAL COURSE:  for full details see H&P, daily progress notes, labs, consult notes.    Patient was subsequently admitted to [facility] further evaluation was management, patient underwent continuous telemetry monitoring, underwent MRI brain which was negative for any acute intracranial pathologies, patient was evaluated by physical therapy as well as Occupational Therapy and after clearance by neurology patient was deemed stable for discharge with close outpatient follow-up with primary care physician as well as neurology, the plan was explained to the patient in details agreeable to current plan.          _______________________________________________________________________  Patient seen and examined by me on discharge day. Pertinent Findings:  Gen:    Not in distress  Chest: Clear lungs  CVS:   Regular rhythm, s1/s2 no m/r/g  No edema  Abd:  Soft, not distended, not tender  Neuro:  Alert, Oriented x 4  _______________________________________________________________________  DISCHARGE MEDICATIONS:   Current Discharge Medication List        START taking these medications    Details   acetaminophen (TYLENOL) 325 mg tablet Take 2 Tablets by mouth every six (6) hours as needed for Pain or Fever. Qty: 60 Tablet, Refills: 0  Start date: 10/30/2022      amitriptyline (ELAVIL) 10 mg tablet Take 1 Tablet by mouth nightly. Qty: 30 Tablet, Refills: 0  Start date: 10/30/2022      aspirin 81 mg chewable tablet Take 1 Tablet by mouth daily. Qty: 30 Tablet, Refills: 0  Start date: 10/31/2022      butalbital-acetaminophen-caffeine (FIORICET, ESGIC) -40 mg per tablet Take 1 Tablet by mouth every six (6) hours as needed for Headache. Qty: 120 Tablet, Refills: 0  Start date: 10/30/2022      melatonin 3 mg tablet Take 2 Tablets by mouth nightly. Qty: 60 Tablet, Refills: 0  Start date: 10/30/2022           CONTINUE these medications which have NOT CHANGED    Details   doxycycline (MONODOX) 100 mg capsule Take 100 mg by mouth two (2) times a day. metoprolol tartrate (LOPRESSOR) 25 mg tablet Take 1 Tab by mouth two (2) times a day. Qty: 180 Tab, Refills: 0    Associated Diagnoses: Coronary artery disease involving native coronary artery of native heart without angina pectoris; Atrial myxoma; Bilateral pulmonary embolism (HCC); S/P CABG x 1      apixaban (ELIQUIS) 5 mg tablet Take 1 Tab by mouth two (2) times a day.  Indications: Pulmonary Thromboembolism  Qty: 180 Tab, Refills: 0    Associated Diagnoses: Coronary artery disease involving native coronary artery of native heart without angina pectoris; Atrial myxoma; Bilateral pulmonary embolism (HCC); S/P CABG x 1      atorvastatin (LIPITOR) 10 mg tablet Take 1 Tab by mouth nightly. Qty: 80 Tab, Refills: 0    Associated Diagnoses: Coronary artery disease involving native coronary artery of native heart without angina pectoris; Atrial myxoma; Bilateral pulmonary embolism (HCC); S/P CABG x 1      metFORMIN (GLUCOPHAGE) 1,000 mg tablet Take 1,000 mg by mouth daily. tamsulosin (FLOMAX) 0.4 mg capsule Take  by mouth as needed. Refills: 3      therapeutic multivitamin (THERAGRAN) tablet Take 1 Tab by mouth daily. Patient Follow Up Instructions: Activity: Activity as tolerated  Diet: Cardiac Diet  Wound Care: As directed    Follow-up with PCP/Neurology in 2 weeks. Follow-up tests/labs As per above physicians  Follow-up Information       Follow up With Specialties Details Why Contact Info    Orestes Eckert MD [facility]   [address] [place] [address]      Orestes Eckert MD Family Medicine Follow up in 2 week(s)  [address] [place] [address]      Bell Kwan MD Neurology Follow up in 1 month(s)  [address] [place] [address]  [phone]            ________________________________________________________________    Risk of deterioration: Low    Condition at Discharge:  Stable  __________________________________________________________________    Disposition  Home with family, no needs    ____________________________________________________________________    Code Status: Full Code  ___________________________________________________________________      Total time in minutes spent coordinating this discharge (includes going over instructions, follow-up, prescriptions, and preparing report for sign off to her PCP) :  45 minutes    Signed:   Rosalie Pinon MD

## 2022-10-30 NOTE — PROGRESS NOTES
Problem: Patient Education: Go to Patient Education Activity  Goal: Patient/Family Education  Outcome: Resolved/Met     Problem: TIA/CVA Stroke: 0-24 hours  Goal: Off Pathway (Use only if patient is Off Pathway)  Outcome: Resolved/Met  Goal: Activity/Safety  Outcome: Resolved/Met  Goal: Consults, if ordered  Outcome: Resolved/Met  Goal: Diagnostic Test/Procedures  Outcome: Resolved/Met  Goal: Nutrition/Diet  Outcome: Resolved/Met  Goal: Discharge Planning  Outcome: Resolved/Met  Goal: Medications  Outcome: Resolved/Met  Goal: Respiratory  Outcome: Resolved/Met  Goal: Treatments/Interventions/Procedures  Outcome: Resolved/Met  Goal: Minimize risk of bleeding post-thrombolytic infusion  Outcome: Resolved/Met  Goal: Monitor for complications post-thrombolytic infusion  Outcome: Resolved/Met  Goal: Psychosocial  Outcome: Resolved/Met  Goal: *Hemodynamically stable  Outcome: Resolved/Met  Goal: *Neurologically stable  Description: Absence of additional neurological deficits    Outcome: Resolved/Met  Goal: *Verbalizes anxiety and depression are reduced or absent  Outcome: Resolved/Met  Goal: *Absence of Signs of Aspiration on Current Diet  Outcome: Resolved/Met  Goal: *Absence of deep venous thrombosis signs and symptoms(Stroke Metric)  Outcome: Resolved/Met  Goal: *Ability to perform ADLs and demonstrates progressive mobility and function  Outcome: Resolved/Met  Goal: *Stroke education started(Stroke Metric)  Outcome: Resolved/Met  Goal: *Dysphagia screen performed(Stroke Metric)  Outcome: Resolved/Met  Goal: *Rehab consulted(Stroke Metric)  Outcome: Resolved/Met     Problem: TIA/CVA Stroke: Day 2 Until Discharge  Goal: Off Pathway (Use only if patient is Off Pathway)  Outcome: Resolved/Met  Goal: Activity/Safety  Outcome: Resolved/Met  Goal: Diagnostic Test/Procedures  Outcome: Resolved/Met  Goal: Nutrition/Diet  Outcome: Resolved/Met  Goal: Discharge Planning  Outcome: Resolved/Met  Goal: Medications  Outcome: Resolved/Met  Goal: Respiratory  Outcome: Resolved/Met  Goal: Treatments/Interventions/Procedures  Outcome: Resolved/Met  Goal: Psychosocial  Outcome: Resolved/Met  Goal: *Verbalizes anxiety and depression are reduced or absent  Outcome: Resolved/Met  Goal: *Absence of aspiration  Outcome: Resolved/Met  Goal: *Absence of deep venous thrombosis signs and symptoms(Stroke Metric)  Outcome: Resolved/Met  Goal: *Optimal pain control at patient's stated goal  Outcome: Resolved/Met  Goal: *Tolerating diet  Outcome: Resolved/Met  Goal: *Ability to perform ADLs and demonstrates progressive mobility and function  Outcome: Resolved/Met  Goal: *Stroke education continued(Stroke Metric)  Outcome: Resolved/Met     Problem: Ischemic Stroke: Discharge Outcomes  Goal: *Verbalizes anxiety and depression are reduced or absent  Outcome: Resolved/Met  Goal: *Verbalize understanding of risk factor modification(Stroke Metric)  Outcome: Resolved/Met  Goal: *Hemodynamically stable  Outcome: Resolved/Met  Goal: *Absence of aspiration pneumonia  Outcome: Resolved/Met  Goal: *Aware of needed dietary changes  Outcome: Resolved/Met  Goal: *Verbalize understanding of prescribed medications including anti-coagulants, anti-lipid, and/or anti-platelets(Stroke Metric)  Outcome: Resolved/Met  Goal: *Tolerating diet  Outcome: Resolved/Met  Goal: *Aware of follow-up diagnostics related to anticoagulants  Outcome: Resolved/Met  Goal: *Ability to perform ADLs and demonstrates progressive mobility and function  Outcome: Resolved/Met  Goal: *Absence of DVT(Stroke Metric)  Outcome: Resolved/Met  Goal: *Absence of aspiration  Outcome: Resolved/Met  Goal: *Optimal pain control at patient's stated goal  Outcome: Resolved/Met  Goal: *Home safety concerns addressed  Outcome: Resolved/Met  Goal: *Describes available resources and support systems  Outcome: Resolved/Met  Goal: *Verbalizes understanding of activation of EMS(911) for stroke symptoms(Stroke Metric)  Outcome: Resolved/Met  Goal: *Understands and describes signs and symptoms to report to providers(Stroke Metric)  Outcome: Resolved/Met  Goal: *Neurolgocially stable (absence of additional neurological deficits)  Outcome: Resolved/Met  Goal: *Verbalizes importance of follow-up with primary care physician(Stroke Metric)  Outcome: Resolved/Met  Goal: *Smoking cessation discussed,if applicable(Stroke Metric)  Outcome: Resolved/Met  Goal: *Depression screening completed(Stroke Metric)  Outcome: Resolved/Met     Problem: Diabetes Self-Management  Goal: *Disease process and treatment process  Description: Define diabetes and identify own type of diabetes; list 3 options for treating diabetes. Outcome: Resolved/Met  Goal: *Incorporating nutritional management into lifestyle  Description: Describe effect of type, amount and timing of food on blood glucose; list 3 methods for planning meals. Outcome: Resolved/Met  Goal: *Incorporating physical activity into lifestyle  Description: State effect of exercise on blood glucose levels. Outcome: Resolved/Met  Goal: *Developing strategies to promote health/change behavior  Description: Define the ABC's of diabetes; identify appropriate screenings, schedule and personal plan for screenings. Outcome: Resolved/Met  Goal: *Using medications safely  Description: State effect of diabetes medications on diabetes; name diabetes medication taking, action and side effects. Outcome: Resolved/Met  Goal: *Monitoring blood glucose, interpreting and using results  Description: Identify recommended blood glucose targets  and personal targets. Outcome: Resolved/Met  Goal: *Prevention, detection, treatment of acute complications  Description: List symptoms of hyper- and hypoglycemia; describe how to treat low blood sugar and actions for lowering  high blood glucose level.   Outcome: Resolved/Met  Goal: *Prevention, detection and treatment of chronic complications  Description: Define the natural course of diabetes and describe the relationship of blood glucose levels to long term complications of diabetes.   Outcome: Resolved/Met  Goal: *Developing strategies to address psychosocial issues  Description: Describe feelings about living with diabetes; identify support needed and support network  Outcome: Resolved/Met  Goal: *Insulin pump training  Outcome: Resolved/Met  Goal: *Sick day guidelines  Outcome: Resolved/Met  Goal: *Patient Specific Goal (EDIT GOAL, INSERT TEXT)  Outcome: Resolved/Met

## 2022-10-30 NOTE — CONSULTS
Date of Consultation:  October 30, 2022    Reason for Consultation:  Headache     Chief Complaint   Patient presents with    Headache     Patient reports worsening headache for the past 2wks, states he woke up tonight and it was significantly worse, and that he was having difficulty with memory and orientation. Patient reports starting a new antibiotic one month ago and had COVID at the end of September. History of Present Illness:   Lyubov Ponce is a 80 y.o. male with a history of hypertension, diabetes and prior pulmonary embolus who is currently admitted to the hospital after he developed a headache approximately 1 month ago. Patient reports that he had COVID approximately 4 weeks ago and after that time he noted having a headache that has been progressively worsening. Patient reports that his headaches are bifrontal in nature described as an aching pain. He rates them anywhere between a 4-8 out of 10 in severity. He does report some mild photosensitivity and phonophobia associated with the headache. He does report that the headache occurs mostly in the evening and nighttime and persists throughout the night. He has tried Tylenol without any improvement in his symptoms. He denies any nausea or vomiting. Additionally he does complain of difficulty with sleep due to nightmares that is he is having almost every night. He reports that this also started after getting COVID 19. He reports that these nightmares are very vivid and he felt like his wife was awoken questioner last night. For this reason he was brought to the hospital for further evaluation. Here he underwent work-up including a noncontrast head CT that was unremarkable, CTA head and neck with perfusion which showed no large vessel occlusion or perfusion abnormality. He did have an MRI of the brain with and without contrast which showed no abnormality. Interval history  No acute events reported overnight.   Patient this morning reports that he is doing well and currently has no headache. He reports that he slept very well with the medication he received. He is eager to be discharged    Past Medical History:   Diagnosis Date    DM (diabetes mellitus) ([facility] [address]     DVT (deep venous thrombosis) ([facility] [address] Right Leg    HTN (hypertension)     PE (pulmonary embolism)     Pneumonia     TIA (transient ischemic attack) 2003        Past Surgical History:   Procedure Laterality Date    CARDIAC SURG PROCEDURE UNLIST      cardiac tumor removed    HX CORONARY ARTERY BYPASS GRAFT  7/20/15        Family History   Problem Relation Age of Onset    Heart Attack Father     Cancer Father         Lung        Social History     Tobacco Use    Smoking status: Never    Smokeless tobacco: Never   Substance Use Topics    Alcohol use: No     Alcohol/week: 0.0 standard drinks        Allergies   Allergen Reactions    Pcn [Penicillins] Nausea and Vomiting        Prior to Admission medications    Medication Sig Start Date End Date Taking? Authorizing Provider   acetaminophen (TYLENOL) 325 mg tablet Take 2 Tablets by mouth every six (6) hours as needed for Pain or Fever. 10/30/22  Yes Kaylin Abbott MD   amitriptyline (ELAVIL) 10 mg tablet Take 1 Tablet by mouth nightly. 10/30/22  Yes Kaylin Abbott MD   aspirin 81 mg chewable tablet Take 1 Tablet by mouth daily. 10/31/22  Yes Kaylin Abbott MD   butalbital-acetaminophen-caffeine (FIORICET, ESGIC) -40 mg per tablet Take 1 Tablet by mouth every six (6) hours as needed for Headache. 10/30/22  Yes Kaylin Abbott MD   melatonin 3 mg tablet Take 2 Tablets by mouth nightly. 10/30/22  Yes Kaylin Abbott MD   doxycycline (MONODOX) 100 mg capsule Take 100 mg by mouth two (2) times a day. Yes Provider, Historical   metoprolol tartrate (LOPRESSOR) 25 mg tablet Take 1 Tab by mouth two (2) times a day.  3/14/17  Yes Juve Kay MD   apixaban Clemente Lute) 5 mg tablet Take 1 Tab by mouth two (2) times a day. Indications: Pulmonary Thromboembolism 3/14/17  Yes Saba Foster MD   atorvastatin (LIPITOR) 10 mg tablet Take 1 Tab by mouth nightly. 3/14/17  Yes Saba Foster MD   metFORMIN (GLUCOPHAGE) 1,000 mg tablet Take 1,000 mg by mouth daily. Yes Provider, Historical   tamsulosin (FLOMAX) 0.4 mg capsule Take  by mouth as needed. 1/13/16  Yes Provider, Historical   therapeutic multivitamin (THERAGRAN) tablet Take 1 Tab by mouth daily. Yes Provider, Historical       Review of Systems:    General, constitutional: negative  Eyes, vision: negative  Ears, nose, throat: negative  Cardiovascular, heart: negative  Respiratory: negative  Gastrointestinal: negative  Genitourinary: negative  Musculoskeletal: negative  Skin and integumentary: negative  Psychiatric: negative  Endocrine: negative  Neurological: negative, except for HPI  Hematologic/lymphatic: negative  Allergy/immunology: negative    PHYSICAL EXAMINATION:   Visit Vitals  /73   Pulse 93   Temp 98.3 °F (36.8 °C)   Resp 16   Ht 6' 2\" (1.88 m)   Wt 149 lb 4 oz (67.7 kg)   SpO2 94%   BMI 19.16 kg/m²       Physical Exam:  General:  no acute distress  Neck: no carotid bruits  Lungs: clear to auscultation  Heart:  no murmurs, regular rate and rhythm   Lower extremity: no edema    Neurological exam:  Mental Status: Awake, alert, oriented to person, place and time  Attention and Concentration: able to state the days of the week backwards   Speech and Language: No dysarthria.  Able to name, repeat and follow commands   Fund of knowledge was preserved    Cranial nerves: II-XII:  Pupils equal and reactive, visual fields intact by confrontation   Extraocular movements intact, no evidence of nystagmus or ptosis   Facial sensation intact   Facial movements symmetric   Hearing intact to soft rub bilaterally   Shoulder shrug symmetric and strong   Tongue protrusion full and midline without fasciculation or atrophy    Motor:   Normal tone and Bulk   Drift: No evidence of pronator drift     Strength testing:   deltoid triceps biceps Wrist ext. Wrist flex. intrinsics   Right 5 5 5 5 5 5   Left 5 5 5 5 5 5      Hip flex. Hip ext. Knee ext. Knee flex Dorsi flex Plantar flex   Right  5 5 5 5 5 5   Left  5 5 5 5 5 5       Sensory:  Sensation intact to light touch and pinprick. There is no extinction. Vibration is intact    Reflexes:   Biceps Triceps  Brachiorad Patellar Achilles Plantar Hoffmans   Right  2 2 2 2 2 Down Neg   Left  2 2 2 2 2 Down Neg      Cerebellar testing: Finger-nose-finger was intact    Gait was noted to be steady    Data:     Lab Results   Component Value Date/Time    Hemoglobin A1c 8.2 (H) 10/30/2022 01:21 AM    Sodium 140 10/30/2022 01:21 AM    Potassium 3.8 10/30/2022 01:21 AM    Chloride 108 10/30/2022 01:21 AM    Glucose 144 (H) 10/30/2022 01:21 AM    BUN 19 10/30/2022 01:21 AM    Creatinine 0.90 10/30/2022 01:21 AM    Calcium 8.7 10/30/2022 01:21 AM    WBC 7.7 10/30/2022 01:21 AM    HCT 39.3 10/30/2022 01:21 AM    HGB 13.1 10/30/2022 01:21 AM    PLATELET 143 87/71/8894 01:21 AM       Imaging:    Results from Hospital Encounter encounter on 10/29/22    MRI BRAIN W WO CONT    Narrative  Clinical history: severe headache, 4 wks post COVID, episode of TGA  INDICATION:   severe headache, 4 wks post COVID, episode of TGA    COMPARISON: 10/29/2022  TECHNIQUE: MR examination of the brain includes axial and sagittal T1 , axial  T2, axial FLAIR, axial gradient echo, axial DWI, coronal T1 . Pre and post  contrast axial T1-weighted imaging. Postcontrast T1-weighted imaging coronal  plane. CONTRAST: 20 ml ProHance  FINDINGS:  There is no intracranial mass, hemorrhage or acute infarction. There are minimal scattered foci of increased T2 signal intensity demonstrated  in the corona radiata, centrum semiovale and central faiza. There is right  maxillary sinus disease. There is sulcal and ventricular prominence. Mild  ethmoid sinus disease. There is no abnormal parenchymal enhancement. There is no  abnormal meningeal enhancement demonstrated. The brain architecture is normal.  There is no evidence of midline shift or mass-effect. The ventricles are normal  in size, position and configuration. There are no extra-axial fluid  collections. Major intracranial vascular flow-voids are unremarkable. The orbits  are grossly symmetric. Dural venous sinuses are grossly unremarkable. There is  no Chiari or syrinx. Pituitary and infundibulum grossly unremarkable. Cerebellopontine angles are unremarkable. No skull base mass is identified. Cavernous sinuses are symmetric. The mastoid air cells and are well pneumatized  and clear. Impression  Mild to moderate K lobar cerebral atrophy. Right maxillary sinus disease. No acute intracranial process. No intracranial mass, hemorrhage or evidence of acute infarction. Results from [place] encounter on 10/29/22    CT CODE NEURO PERF W CBF    FINAL REPORT:    INDICATION: Code Neuro    EXAMINATION:  CT ANGIOGRAPHY HEAD AND NECK    COMPARISON: CT head earlier the same day    TECHNIQUE:  Following the uneventful administration of  intravenous contrast,  axial CT angiography of the head and neck was performed. 3D image  postprocessing was performed. CT dose reduction was achieved through use of a  standardized protocol tailored for this examination and automatic exposure  control for dose modulation. Cerebral perfusion analysis using computed tomography with contrast  administration, including post processing of parametric maps with the  termination of cerebral blood flow, cerebral blood volume, and mean transit  time. This study was analyzed by the [address] ai algorithm. FINDINGS:    CTA NECK    Aortic Arch: Patent. Right Common Carotid Artery: Patent. Right Internal Carotid Artery: Patent. NASCET Right: 0-25%. Left Common Carotid Artery: Patent.   Left Internal Carotid Artery: Calcific plaque along the posterior wall of the  origin of the internal carotid artery though without significant luminal  narrowing. NASCET Left: Less than 25%. Carotid stenosis determined using NASCET criteria. Right Vertebral Artery: Patent. Left Vertebral Artery: Patent. Cervical Soft Tissues: No significant abnormality. Lung Apices: Few small ill-defined nodular densities in the superior segment of  the right lower lobe. AP window lymph nodes measuring up to 1.9 cm previously  1.5 cm. Bones: No destructive bone lesion. Additional Comments: Right maxillary sinus opacification. CTA HEAD    Posterior Circulation: No flow limiting stenosis or occlusion. Anterior Circulation: Calcific atherosclerosis and irregularity bilateral  cavernous and supraclinoid internal carotid artery segments, without  flow-limiting stenosis. Anterior and middle cerebral arteries are patent. Additional Comments: No evidence of aneurysm or vascular malformation. CT PERFUSION:  Areas of hypoperfusion superior frontal lobes bilaterally, likely artifactual.  RCBF < 30% = 11 cc. Tmax > 6 seconds = 16 cc. Mismatch volume = 5 cc. Impression  1. No acute large vessel occlusion. Atherosclerotic vascular disease without  flow-limiting stenosis as above. 2.  Areas of hypoperfusion in the superior frontal lobes bilaterally most likely  artifactual, though if there is clinical concern for acute infarction consider  MRI. 3. Vague nodular densities superior segment right lower lobe, and interval  enlargement of AP window lymph nodes up to 1.9 cm. This may be better assessed  with nonemergent outpatient CT chest as clinically indicated. IMPRESSION/RECOMMENDATIONS:  Ondina Jennings is a 80 y.o. male with a history of hypertension, diabetes and prior pulmonary embolus who is currently admitted to the hospital after he developed a headache which does appear to be a tension type headaches.   It is possible that these headaches were triggered by his recent COVID-19 infection. He does also complain of insomnia which is likely contributing to the headaches. MRI of the brain was completed which showed no evidence of stroke bleed or tumor. His neurological exam is unremarkable    Headache: Appears to be tension type headaches given the location and description of the pain. He does not have any migrainous features. This could also be related to sleep deprivation  -We will trial Fioricet 1 tablet every 6-8 hours to determine if this helps his symptoms. We did discuss that this is not a long-term option.  -For sleep we also discussed melatonin as this would be a safe alternative. If he does not want to  He can try melatonin first and if it does not improve his symptoms proceed with amitriptyline. Amitriptyline would be a good preventative option for both migraine prevention and treatment of his anxiety as well as insomnia  -We discussed the importance of a proper diet including plenty of fruits and vegetables as this can help with headache prevention.  -We discussed the importance of staying hydrated and drinking at least 32 to 64 ounces of water daily as this can be a cause of tension type headaches.  -We also discussed the importance of keeping a headache journal or log to identify triggers.  -We discussed the importance of sleep hygiene and attempting to get at least 6 to 8 hours of sleep daily to help with headache prevention. Atrophy noted on MRI of the brain: Family does not notice any concerns for decline or change in his memory.   -I discussed with the atrophy noted on his brain MRI if he may be at risk for dementia in the future.   We did discuss strategies for slowing the progression or reducing the risk of memory problems  -We also discussed the importance of diet and exercise and how this has been shown to slow the progression or reduce the risk of developing neurodegenerative diseases  -We did discuss that he may potentially benefit from neuropsych testing however this can be discussed as an outpatient if warranted    Thank you very much for this consultation, will sign off. Please call with any additional questions. Please have patient follow-up in neurology clinic in 1 to 2 months.     Angelia Ling MD

## 2022-10-30 NOTE — PROGRESS NOTES
AVS reviewed with pt and spouse, and both verbalized understanding. PIV's removed, 2x2 applied to site, site wnl. Pt wheeled down to private car with all belongings.

## 2022-10-30 NOTE — PROGRESS NOTES
Transition of Care Plan:    RUR: N/A, patient is in obs   Disposition: Home with follow-up appts  Follow up appointments: To be scheduled by patient   DME needed: N/A  Transportation at Discharge: The patient' wife is at bedside and will be transporting him home   [employer] or means to access home: Yes       IM Medicare Letter: N/A, patient is in obs   Is patient a [place] and connected with the [place]? N/A              If yes, was [employer] transfer form completed and [facility] notified? Caregiver Contact: Marcelo Blackman, Wife, Phone: [phone]  Discharge Caregiver contacted prior to discharge? Yes, CM spoke with the patient's wife at [facility] needed?: No    CM was alerted that the patient is being discharged home today, 10/30/22. CM met with the patient and his wife at bedside and they have no concerns for discharge and the patient is eager to return home. His wife will be driving him home. PT/OT cleared the patient for home with no needs. From CM perspective, the patient can be discharged. Medicare Outpatient Observation Notice (MOON) provided to patient/representative with verbal explanation of the notice. Time allotted for questions regarding the notice. Patient /representative provided a completed copy of the MOON notice. Copy placed on bedside chart.      Reason for Admission:  Headaches/Confusion                 RUR Score: Unknown, patient is in obs                    Plan for utilizing home health:  PT/OT worked with the patient and cleared the patient for home with no needs         PCP: First and Last name:  Jenane Barroso MD   Name of Practice:    Are you a current patient: Yes/No: Yes   Approximate date of last visit: The patient saw his PCP last October    Can you participate in a virtual visit with your PCP: No                    Current Advanced Directive/Advance Care Plan: Full Code    Healthcare Decision Maker: Marcelo Blackman, Wife, Phone: [phone]                 Transition of Care Plan:         CM met with the patient at bedside to discuss dispo needs. The patient lives in a 1 level home with 4 steps to enter with handrails with his wife. The patient has 1 son and 1 daughter that both live locally. He has 4 grandchildren. His family is very supportive. At baseline, the patient is completely independent in his ADLs and IADLs. He uses no assistive device when ambulating. The patient is able to drive and his wife will be transporting him home upon d/c. He uses [facility] or the [facility] [address] for his medications. [facility] or been to a SNF/IPR facility in the past. He has done outpatient therapy. The patient likely has no CM needs. Care Management Interventions  PCP Verified by CM: Yes (The patient saw his PCP about 1 year ago )  Mode of Transport at Discharge:  Other (see comment) (The patient's wife will be transporting him home upon d/c )  Transition of Care Consult (CM Consult): Discharge Planning  MyChart Signup: No  Discharge Durable Medical Equipment: No  Physical Therapy Consult: Yes  Occupational Therapy Consult: Yes  Speech Therapy Consult: Yes  Support Systems: Child(christine), Spouse/Significant Other, Other Family Member(s)  Confirm Follow Up Transport: Self  The Patient and/or Patient Representative was Provided with a Choice of Provider and Agrees with the Discharge Plan?: Yes  Name of the Patient Representative Who was Provided with a Choice of Provider and Agrees with the Discharge Plan: [id number] Provided?: No  Discharge Location  Patient Expects to be Discharged to[de-identified] Home     [place], [place]

## 2022-10-30 NOTE — PROGRESS NOTES
End of Shift Note    Bedside shift change report given to Jael(oncoming nurse) by Kobi Pastor RN (offgoing nurse). Report included the following information SBAR    Shift worked:  nights     Shift summary and any significant changes:     No changes overnight     Concerns for physician to address:  none     Zone phone for oncoming shift:   [phone]       Activity:  Activity Level: Up ad ron  Number times ambulated in hallways past shift: 0  Number of times OOB to chair past shift: 0    Cardiac:   Cardiac Monitoring: Yes      Cardiac Rhythm: Sinus Rhythm    Access:  Current line(s): PIV     Genitourinary:   Urinary status: voiding    Respiratory:   O2 Device: None (Room air)  Chronic home O2 use?: YES  Incentive spirometer at bedside: YES       GI:     Current diet:  ADULT DIET Regular; 5 carb choices (75 gm/meal); Low Fat/Low Chol/High Fiber/CLAUDIA; No Salt Added (3-4 gm)  ADULT ORAL NUTRITION SUPPLEMENT Breakfast, Lunch, Dinner; Diabetic Supplement  Passing flatus: YES  Tolerating current diet: YES       Pain Management:   Patient states pain is manageable on current regimen: YES    Skin:  Dann Score: 22  Interventions: turn team    Patient Safety:  Fall Score:  Total Score: 1  Interventions: bed/chair alarm       Length of Stay:  Expected LOS: - - -  Actual LOS: 0      Kobi Pastor RN

## 2022-11-22 ENCOUNTER — OFFICE VISIT (OUTPATIENT)
Dept: NEUROLOGY | Age: 82
End: 2022-11-22
Payer: MEDICARE

## 2022-11-22 VITALS
RESPIRATION RATE: 16 BRPM | WEIGHT: 139 LBS | HEART RATE: 81 BPM | BODY MASS INDEX: 17.84 KG/M2 | OXYGEN SATURATION: 99 % | HEIGHT: 74 IN | SYSTOLIC BLOOD PRESSURE: 108 MMHG | DIASTOLIC BLOOD PRESSURE: 74 MMHG

## 2022-11-22 DIAGNOSIS — R29.90 POST-COVID CHRONIC NEUROLOGIC SYMPTOMS: ICD-10-CM

## 2022-11-22 DIAGNOSIS — G44.211 INTRACTABLE EPISODIC TENSION-TYPE HEADACHE: Primary | ICD-10-CM

## 2022-11-22 DIAGNOSIS — F41.9 ANXIETY: ICD-10-CM

## 2022-11-22 DIAGNOSIS — U09.9 POST-COVID CHRONIC NEUROLOGIC SYMPTOMS: ICD-10-CM

## 2022-11-22 PROCEDURE — 3078F DIAST BP <80 MM HG: CPT | Performed by: PSYCHIATRY & NEUROLOGY

## 2022-11-22 PROCEDURE — 1101F PT FALLS ASSESS-DOCD LE1/YR: CPT | Performed by: PSYCHIATRY & NEUROLOGY

## 2022-11-22 PROCEDURE — G8752 SYS BP LESS 140: HCPCS | Performed by: PSYCHIATRY & NEUROLOGY

## 2022-11-22 PROCEDURE — G8754 DIAS BP LESS 90: HCPCS | Performed by: PSYCHIATRY & NEUROLOGY

## 2022-11-22 PROCEDURE — G8419 CALC BMI OUT NRM PARAM NOF/U: HCPCS | Performed by: PSYCHIATRY & NEUROLOGY

## 2022-11-22 PROCEDURE — G8427 DOCREV CUR MEDS BY ELIG CLIN: HCPCS | Performed by: PSYCHIATRY & NEUROLOGY

## 2022-11-22 PROCEDURE — G8510 SCR DEP NEG, NO PLAN REQD: HCPCS | Performed by: PSYCHIATRY & NEUROLOGY

## 2022-11-22 PROCEDURE — G8536 NO DOC ELDER MAL SCRN: HCPCS | Performed by: PSYCHIATRY & NEUROLOGY

## 2022-11-22 PROCEDURE — 3074F SYST BP LT 130 MM HG: CPT | Performed by: PSYCHIATRY & NEUROLOGY

## 2022-11-22 PROCEDURE — 99214 OFFICE O/P EST MOD 30 MIN: CPT | Performed by: PSYCHIATRY & NEUROLOGY

## 2022-11-22 PROCEDURE — 1123F ACP DISCUSS/DSCN MKR DOCD: CPT | Performed by: PSYCHIATRY & NEUROLOGY

## 2022-11-22 RX ORDER — HYDROXYZINE HYDROCHLORIDE 10 MG/1
TABLET, FILM COATED ORAL
COMMUNITY
Start: 2022-11-15

## 2022-11-22 NOTE — PROGRESS NOTES
Neurology Note    Patient ID:  Ida Brown  [id number]  67 y.o.  1940      Date of Consultation:  November 22, 2022            Assessment and Plan:    The patient is a pleasant 51-year-old gentleman with recent bout of COVID and subsequent unrelenting headache with recent admission to the hospital.  His headache has resolved however he has developed increasing jitteriness and upset stomach and requested a urgent neurological evaluation today. His neurological examination today revealed normal strength, sensation, and reflexes. Recent hospitalization for acute onset headache: This did respond to treatment while hospitalized. Brain MRI as well as CTA obtained during the hospitalization were unremarkable. He currently is not experiencing any headaches. He can continue to use Tylenol as needed from a headache perspective. Increasing jitteriness/anxiety/insomnia:  He does have increasing stress and anxiety. He has just been started on hydroxyzine by his primary care doctor and took the first dose last night. He was recently prescribed amitriptyline but did not like the way that made him feel and he stopped the medication. I recommend that he continue with the hydroxyzine and give this a few more days and then follow back up with his primary care doctor. He does acknowledge this and will do so. Increasing GI upset- most likely anxiety related. I did recommend that he take Tagamet and Tums and to follow closely with his primary care doctor if this does not improve    Diabetes: Continue aggressive glycemic control  Dyslipidemia: On statin therapy  Prior hypercoagulable state: On anticoagulation             Subjective: I was hospitalized       History of Present Illness:   Ida Brown is a 80 y.o. male who presents to the neurology clinic at [facility] for an evaluation.   He was recently seen approximately 3 weeks ago by one of my colleagues, Vicki Keita.  Patient does have a history of hypertension, diabetes and a prior pulmonary embolism. He was hospitalized for an acute onset of an unrelenting headache. The headache was anywhere between a 4-8 out of 10 in severity. There was photophobia and sonophobia that was associated with a headache. He did have a MRI of his brain which was unremarkable. He also did have a CT and a CTA of the head neck which revealed no abnormalities. Reportedly his neurological examination was normal in the hospital.  Updated labs include a hemoglobin A1c of 8.2. It was felt the headaches were tension headaches. He was given a dose of Fioricet. He was also given melatonin to help with his insomnia. It appears his headaches were improved and he was ultimately discharged. He was discharged on amitriptyline, Fioricet, aspirin and melatonin. He stated that after he got home and he started taking those medications that it made him feel worse. He immediately had to stop those medications. He states that he was diagnosed with long-haul COVID and that is what is contributing to a lot of his symptoms. He is no longer having headaches but he feels that he is more jittery and that his stomach is tied up in knots. He is also having insomnia. He went to see his primary care doctor and was started on hydroxyzine. He took his first dose last night and then 5 hours later he took a second dose and this morning he felt more jittery and his stomach was tied up in even more knots. He took a significant amount of Mylanta and called for an urgent visit today. Currently he is not having severe headaches. In fact, he is not having any headaches at all right now. He is quite anxious. He denies any focal numbness, tingling, or weakness.     Past Medical History:   Diagnosis Date    DM (diabetes mellitus) ([facility] [address]     DVT (deep venous thrombosis) (HCC) Right Leg    HTN (hypertension)     PE (pulmonary embolism)     Pneumonia     TIA (transient ischemic attack) 2003        Past Surgical History:   Procedure Laterality Date    HX CORONARY ARTERY BYPASS GRAFT  7/20/15    [facility] CARDIAC SURG PROCEDURE UNLIST      cardiac tumor removed        Family History   Problem Relation Age of Onset    Heart Attack Father     Cancer Father         Lung        Social History     Tobacco Use    Smoking status: Never    Smokeless tobacco: Never   Substance Use Topics    Alcohol use: No     Alcohol/week: 0.0 standard drinks        Allergies   Allergen Reactions    Pcn [Penicillins] Nausea and Vomiting        Prior to Admission medications    Medication Sig Start Date End Date Taking? Authorizing Provider   acetaminophen (TYLENOL) 325 mg tablet Take 2 Tablets by mouth every six (6) hours as needed for Pain or Fever. 10/30/22  Yes Akin Starr MD   metoprolol tartrate (LOPRESSOR) 25 mg tablet Take 1 Tab by mouth two (2) times a day. 3/14/17  Yes Runell Sever, MD   apixaban (ELIQUIS) 5 mg tablet Take 1 Tab by mouth two (2) times a day. Indications: Pulmonary Thromboembolism 3/14/17  Yes Runell Sever, MD   atorvastatin (LIPITOR) 10 mg tablet Take 1 Tab by mouth nightly. 3/14/17  Yes Runell Sever, MD   metFORMIN (GLUCOPHAGE) 1,000 mg tablet Take 500 mg by mouth three (3) times daily. Yes Provider, Historical   tamsulosin (FLOMAX) 0.4 mg capsule Take 0.4 mg by mouth daily. 1/13/16  Yes Provider, Historical   therapeutic multivitamin (THERAGRAN) tablet Take 1 Tab by mouth daily.    Yes Provider, Historical   hydrOXYzine HCL (ATARAX) 10 mg tablet TAKE 1 OR 2 TABLETS BY MOUTH AT BEDTIME AS NEEDED FOR SLEEP  Patient not taking: Reported on 11/22/2022 11/15/22   Provider, Historical       Review of Systems:    General, constitutional: Anxious  Eyes, vision: negative  Ears, nose, throat: negative  Cardiovascular, heart: negative  Respiratory: negative  Gastrointestinal: negative  Genitourinary: negative  Musculoskeletal: negative  Skin and integumentary: negative  Psychiatric: negative  Endocrine: negative  Neurological: negative, except for HPI  Hematologic/lymphatic: negative  Allergy/immunology: negative    Objective:     Visit Vitals  /74 (BP 1 Location: Left upper arm, BP Patient Position: Sitting, BP Cuff Size: Adult)   Pulse 81   Resp 16   Ht 6' 2\" (1.88 m)   Wt 139 lb (63 kg)   SpO2 99%   BMI 17.85 kg/m²       Physical Exam:      General:  appears well nourished in no acute distress. He is slightly anxious during the visit today  Neck: no carotid bruits  Lungs: clear to auscultation  Heart:  no murmurs, regular rate  Lower extremity: peripheral pulses palpable and no edema  Skin: intact    Neurological exam:    Awake, alert, oriented to person, place and time  Recent and remote memory were normal  Attention and concentration were intact  Language was intact. There was no aphasia  Speech: no dysarthria  Fund of knowledge was preserved    Cranial nerves:   II-XII were tested    Perrrla  Visual fields were full  Eomi, no evidence of nystagmus  Facial sensation:  normal and symmetric  Facial motor: normal and symmetric  Hearing intact  SCM strength intact  Tongue: midline without fasciculations    Motor: Tone normal-there was no cogwheel rigidity. Pronator drift was absent  No evidence of fasciculations    Strength testing:   deltoid triceps biceps Wrist ext. Wrist flex. intrinsics Hip flex. Hip ext. Knee ext. Knee flex Dorsi flex Plantar flex   Right 5 5 5 5 5 5 5 5 5 5 5 5   Left 5 5 5 5 5 5 5 5 5 5 5 5         Sensory:  Upper extremity: intact to pp, light touch, and vibration   Lower extremity: intact to pp, light touch, and vibration was 5 seconds at his ankles    Reflexes:    Right Left  Biceps  2 2  Triceps 2 2  Brachiorad. 2 2  Patella  2 2  Achilles 2 2    Plantar response:  flexor bilaterally      Cerebellar testing:  no resting tremor apparent, finger/nose and rob were intact. He does have a mild action tremor in his bilateral upper extremities.     Romberg: absent    Gait: steady. Slow, steady  Labs:     Lab Results   Component Value Date/Time    Hemoglobin A1c 8.2 (H) 10/30/2022 01:21 AM    Sodium 140 10/30/2022 01:21 AM    Potassium 3.8 10/30/2022 01:21 AM    Chloride 108 10/30/2022 01:21 AM    Glucose 144 (H) 10/30/2022 01:21 AM    BUN 19 10/30/2022 01:21 AM    Creatinine 0.90 10/30/2022 01:21 AM    Calcium 8.7 10/30/2022 01:21 AM    WBC 7.7 10/30/2022 01:21 AM    HCT 39.3 10/30/2022 01:21 AM    HGB 13.1 10/30/2022 01:21 AM    PLATELET 231 98/57/0708 01:21 AM       Imaging:    Results from Hospital Encounter encounter on 10/29/22    MRI BRAIN W WO CONT    Narrative  Clinical history: severe headache, 4 wks post COVID, episode of TGA  INDICATION:   severe headache, 4 wks post COVID, episode of TGA    COMPARISON: 10/29/2022  TECHNIQUE: MR examination of the brain includes axial and sagittal T1 , axial  T2, axial FLAIR, axial gradient echo, axial DWI, coronal T1 . Pre and post  contrast axial T1-weighted imaging. Postcontrast T1-weighted imaging coronal  plane. CONTRAST: 20 ml ProHance  FINDINGS:  There is no intracranial mass, hemorrhage or acute infarction. There are minimal scattered foci of increased T2 signal intensity demonstrated  in the corona radiata, centrum semiovale and central faiza. There is right  maxillary sinus disease. There is sulcal and ventricular prominence. Mild  ethmoid sinus disease. There is no abnormal parenchymal enhancement. There is no  abnormal meningeal enhancement demonstrated. The brain architecture is normal.  There is no evidence of midline shift or mass-effect. The ventricles are normal  in size, position and configuration. There are no extra-axial fluid  collections. Major intracranial vascular flow-voids are unremarkable. The orbits  are grossly symmetric. Dural venous sinuses are grossly unremarkable. There is  no Chiari or syrinx. Pituitary and infundibulum grossly unremarkable. Cerebellopontine angles are unremarkable.  No skull base mass is identified. Cavernous sinuses are symmetric. The mastoid air cells and are well pneumatized  and clear. Impression  Mild to moderate K lobar cerebral atrophy. Right maxillary sinus disease. No acute intracranial process. No intracranial mass, hemorrhage or evidence of acute infarction. Results from [place] encounter on 10/29/22    CT CODE NEURO PERF W CBF    Narrative      FINAL REPORT:    INDICATION: Code Neuro    EXAMINATION:  CT ANGIOGRAPHY HEAD AND NECK    COMPARISON: CT head earlier the same day    TECHNIQUE:  Following the uneventful administration of  intravenous contrast,  axial CT angiography of the head and neck was performed. 3D image  postprocessing was performed. CT dose reduction was achieved through use of a  standardized protocol tailored for this examination and automatic exposure  control for dose modulation. Cerebral perfusion analysis using computed tomography with contrast  administration, including post processing of parametric maps with the  termination of cerebral blood flow, cerebral blood volume, and mean transit  time. This study was analyzed by the [address] ai algorithm. FINDINGS:    CTA NECK    Aortic Arch: Patent. Right Common Carotid Artery: Patent. Right Internal Carotid Artery: Patent. NASCET Right: 0-25%. Left Common Carotid Artery: Patent. Left Internal Carotid Artery: Calcific plaque along the posterior wall of the  origin of the internal carotid artery though without significant luminal  narrowing. NASCET Left: Less than 25%. Carotid stenosis determined using NASCET criteria. Right Vertebral Artery: Patent. Left Vertebral Artery: Patent. Cervical Soft Tissues: No significant abnormality. Lung Apices: Few small ill-defined nodular densities in the superior segment of  the right lower lobe. AP window lymph nodes measuring up to 1.9 cm previously  1.5 cm. Bones: No destructive bone lesion.   Additional Comments: Right maxillary sinus opacification. CTA HEAD    Posterior Circulation: No flow limiting stenosis or occlusion. Anterior Circulation: Calcific atherosclerosis and irregularity bilateral  cavernous and supraclinoid internal carotid artery segments, without  flow-limiting stenosis. Anterior and middle cerebral arteries are patent. Additional Comments: No evidence of aneurysm or vascular malformation. CT PERFUSION:  Areas of hypoperfusion superior frontal lobes bilaterally, likely artifactual.  RCBF < 30% = 11 cc. Tmax > 6 seconds = 16 cc. Mismatch volume = 5 cc. Impression  1. No acute large vessel occlusion. Atherosclerotic vascular disease without  flow-limiting stenosis as above. 2.  Areas of hypoperfusion in the superior frontal lobes bilaterally most likely  artifactual, though if there is clinical concern for acute infarction consider  MRI. 3. Vague nodular densities superior segment right lower lobe, and interval  enlargement of AP window lymph nodes up to 1.9 cm. This may be better assessed  with nonemergent outpatient CT chest as clinically indicated. 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 The patient had the ability to ask questions and all questions were answered.                Signed By:  Vicente Child DO FAAN    November 22, 2022

## 2022-11-22 NOTE — PATIENT INSTRUCTIONS
Please use tagamet over the counter each morning to help your stomach. Also, consider using tums 1-2 times a day.     Please make sure to follow up with your pcp

## 2022-11-22 NOTE — LETTER
11/22/2022    Patient: Kourtney Richmond   YOB: 1940   Date of Visit: 11/22/2022     Isabel Garcia MD  [address] [address]  Via Fax: [phone]    Dear Isabel Garcia MD,      Thank you for referring Mr. Skylar Roe to [address] [place] [address] for evaluation. My notes for this consultation are attached. If you have questions, please do not hesitate to call me. I look forward to following your patient along with you.       Sincerely,    Ranjan Mooney, DO

## 2022-11-22 NOTE — PROGRESS NOTES
1. Have you been to the ER, urgent care clinic since your last visit? Hospitalized since your last visit? Seen on 10/29/22. 2. Have you seen or consulted any other health care providers outside of the [address] [place] [address] since your last visit? Include any pap smears or colon screening.    No.        Chief Complaint   Patient presents with    [address] in September and gave him headaches- stomach issues, trouble eating, shakiness- very nervous

## 2022-12-09 ENCOUNTER — TELEPHONE (OUTPATIENT)
Dept: NEUROLOGY | Age: 82
End: 2022-12-09

## 2022-12-09 RX ORDER — NORTRIPTYLINE HYDROCHLORIDE 10 MG/1
10 CAPSULE ORAL
Qty: 30 CAPSULE | Refills: 3 | Status: SHIPPED | OUTPATIENT
Start: 2022-12-09

## 2022-12-09 NOTE — TELEPHONE ENCOUNTER
Called pt,verified pt with two pt identifiers, pt advised he is still having headaches. Advised in his office note from 11/22/22 with  he was not having any headaches. Pt advised his headaches never stopped. In fact he thinks his stomach issues are related to his headaches and not sleeping well. He blames his headaches for all his problems going on. He has been taking Tylenol as needed, he does not want to take too much due to rebound headaches. He did see his PCP on 11/29/22 and he put him on Pepcid for his stomach. Advised we can't help him with his other symptoms but I can send Liban Roberta a message regarding his headaches. Advised I will call him back once he responds. Pt verbalized understanding.

## 2022-12-09 NOTE — TELEPHONE ENCOUNTER
Voicemail:    Patient called stating that since his visit on 11/22, his symptoms have worsened. Pt would like a call back from Nurse or Dr Kike Morales to discuss next steps.  Please call [phone]

## 2022-12-12 NOTE — TELEPHONE ENCOUNTER
Ranjan Mooney, DO  You 3 days ago     SV  I will also prescribe nurtec to take as needed. No more than 8 pills in one month as he could not tolerate fioricet. Ranjan Mooney, DO  You 3 days ago     [address] [facility] [address] I can prescribe him something to take once a day such as nortryptline. I will send this to his pharmacy. Thanks          Called pt and left voice message to call me back.

## 2022-12-12 NOTE — TELEPHONE ENCOUNTER
Returned call,verified pt with two pt identifiers, advised pt  sent in Nortriptyline 10 mg nightly and Nurtec 75 mg tab as needed to his pharmacy. Advise to take the Nortriptyline at bedtime about 1 hour before bedtime. Advised the Nurtec is for the migraines or really bad headaches he cannot get to go away. He can only take 8 tabs of Nurtec in 1  month. Pt verbalized understanding of both medications and thanked me for my help.

## 2022-12-12 NOTE — TELEPHONE ENCOUNTER
Voicemail:    Patient returned call to Nurse. Apologized for missing call, stated he was not at home.    [phone]

## 2023-01-26 ENCOUNTER — TELEPHONE (OUTPATIENT)
Dept: NEUROLOGY | Age: 83
End: 2023-01-26

## 2023-01-26 NOTE — TELEPHONE ENCOUNTER
Ekaterina Hansensavanah, Pt's daughter, called stating that Patient is having issues with chronic headaches, depression, anxiety, trouble sleeping, and she stated that Pt has been saying specifically that he cannot control his brain and he cannot make himself stop worrying. Ekaterina Yoon asked that the Nurse or Dr Nena Schmitz call Pt to discuss.       Please call [phone]

## 2023-01-27 NOTE — TELEPHONE ENCOUNTER
Returned call,verified pt with two pt identifiers, advised pt his daughter Charlie Solis called me and left me a message stating he was having some issues. Pt stated since he has had COVID he has been experiencing these issues. He notes headaches- he tried the nontriptyline for 3 nights but it made his headaches worse. No other symptoms. He never got the medication Nurtec due to it needing a PA with insurance. Advised I could send to our PA specialist to work on. Pt stated he does not think he gets migraines, just headaches. He is not sure he would use the medication. Pt is taking Tylenol, Ibuprofen as needed. Advised he can continue this for the time being if it is working. He agreed. Asked pt if he has talked to his PCP regarding his symptoms of depression, sleeplessness, anxiety. He has, he was started on medication Mirtapine 15 mg nightly and has helped some. He has been on it for 2 weeks. Advised he might need to be on the medication longer to reach optim[employer] benefits for him. Advised the PCP can go up on this medication or prescribe other medications to help with his symptoms. He does have an appt in 2 weeks with his PCP. Advised pt of seeking help with mental health provider. He can call his insurance for whom he may see and also his PCP can help recommend someone. I advised I will also mail out our paper of recommendations on mental health providers. Advised pt that Lea Morales will not be able to treat  his depression or sleeplessness-that will come from his PCP. Spent 20 minutes on the phone with pt discussing this matter. Pt verbalized understanding and thanked me for my help. Put paper of mental healthcare providers in mail to pt.

## 2023-02-01 ENCOUNTER — TELEPHONE (OUTPATIENT)
Dept: NEUROLOGY | Age: 83
End: 2023-02-01

## 2023-02-01 NOTE — TELEPHONE ENCOUNTER
RE:Nurtec 8 for 30 for abortive sent to [facility].   Status is pending   [id number] - PA Case ID: [id number] - Rx #: [id number]

## 2023-02-02 ENCOUNTER — TRANSCRIBE ORDER (OUTPATIENT)
Dept: SCHEDULING | Age: 83
End: 2023-02-02

## 2023-02-02 DIAGNOSIS — R91.8 OTHER NONSPECIFIC ABNORMAL FINDING OF LUNG FIELD: Primary | ICD-10-CM

## 2023-02-10 ENCOUNTER — TELEPHONE (OUTPATIENT)
Dept: NEUROLOGY | Age: 83
End: 2023-02-10

## 2023-02-10 NOTE — TELEPHONE ENCOUNTER
Returned call to [facility] and he voices concerns of pt having recurring head pressure, headaches. Also bouts of depression, sleeplessness. He has prescribed medication for pt but not much relief. All this stemming from his bout with COVID. He did see where  prescribed the [facility] for help with the headaches. He had his nurse reach out to pt today and pt reports feeling ok today. He will reach out to pt next week to check on him. [facility] is worried that pt could have autoimmune encephalitis and possibly need a spinal tap or other testing. [facility] advised he could [facility] [address] [facility] [facility] next. I advised [facility] that [address] is out of the office until next week and I would send him this note if that is ok.  advised this is ok.

## 2023-02-10 NOTE — TELEPHONE ENCOUNTER
Voicemail:    Patient's PCP, Dr Amy Brown, called requesting to speak with Dr Saniya Aly to discuss Pt.      Dr Landy Epstein Cell #:  [phone]

## 2023-02-14 NOTE — TELEPHONE ENCOUNTER
Message  Received: Jose G Mendoza DO sent to Chandrika Edge LPN  Caller: Unspecified (4 days ago,  1:20 PM)  Hi     Can you see if this patient could do a virtual visit sometime in the afternoon this Friday and I will work it in between the procedures? Thanks.    Kade Nicolas

## 2023-02-14 NOTE — TELEPHONE ENCOUNTER
Called pt,verified pt with two pt identifiers, advised pt that his PCP reached out to Annabella Carmichael in regards to his symptoms, headaches. Annabella Carmichael would like to do a virtual visit on Friday if pt is able to. Pt does not have a smart phone or computer. Advised I will need to send to   to see what he wants to do next. Maybe phone call or in office visit. Pt verbalized understanding.

## 2023-02-14 NOTE — TELEPHONE ENCOUNTER
Pt returned Leonora's call. Wanted her to know that he can talk to Dr. Sherly Ledesma on the telephone Friday afternoon/evening if that will work.  Please call to discuss further [phone]

## 2023-02-15 NOTE — TELEPHONE ENCOUNTER
Message  Received: Today  Mani Mooney DO sent to Chandrika Edge LPN  Caller: Unspecified (5 days ago,  1:20 PM)  Hi,     Can he come in at 7:30 for a visit? If not, we can do a phone visit at 7:30. Thanks     Lulú pt,verified pt with two pt identifiers, advised that Marcelino Ewing wants to do either an office visit or phone visit at 7:30 am on Friday with him. Pt advised he could do the phone visit. Advised I will call the day before and update the chart with pt before his phone call. Pt verbalized understanding. Scheduled for phone visit.

## 2023-02-16 VITALS — BODY MASS INDEX: 18.1 KG/M2 | HEIGHT: 74 IN | WEIGHT: 141 LBS

## 2023-02-16 RX ORDER — MIRTAZAPINE 45 MG/1
45 TABLET, FILM COATED ORAL AS NEEDED
COMMUNITY
Start: 2023-02-01

## 2023-02-16 NOTE — PROGRESS NOTES
1. Have you been to the ER, urgent care clinic since your last visit? Hospitalized since your last visit? No.      2. Have you seen or consulted any other health care providers outside of the [address] [place] [address] since your last visit? Include any pap smears or colon screening. No.    HAVING CHEST CT SCAN NEXT WEEK.    [address] NUMBER [phone]      Chief Complaint   Patient presents with    Headache     Discuss PCP visit, headaches

## 2023-02-16 NOTE — PROGRESS NOTES
Neurology Note    Patient ID:  Timothy Bell  [id number]  78 y.o.  1940      Date of Consultation:  February 17, 2023    Timothy Bell is a 80 y.o. male, evaluated via audio-only technology on 2/17/2023 for headaches    Assessment & Plan:    The patient is a pleasant 80-year-old gentleman with recent bout of COVID and subsequent unrelenting headache with admission in 10/2022 to the hospital.  His headache continues to improve as well as his depression/insomnia and appetite. Neurology follow up was requested to see if any additional testing was necessary. hospitalization in 10/2022 for acute onset headache - residual headache, improving  Brain MRI as well as CTA obtained during the hospitalization were unremarkable. His headaches have decreased in intensity and is taking rare Tylenol now. Given the decrease in the intensity and frequency of the headaches and the need for occasional over-the-counter medication, I do not think any other additional testing will be necessary at this time. This may be all part of a prolonged long haul COVID that is resolving. I did discuss with the patient today if that if symptoms worsen, he needs to contact the office and we can consider additional testing including neuroimaging or other procedures but given his overall quality of life and headaches are improving, will continue to proceed cautiously and give this more time. He is in agreement with this plan. Overall, he feels that he is doing better. jitteriness/anxiety/insomnia-improving:  He reports that his sleeping has gotten better. He does continue on to take medication but is requiring less medication from a depression/anxiety. We did talk about this at length and he asked about other potential treatment options. We will give him a list of psychologists if he would like to have any cognitive therapy. He will continue to follow closely with his primary care doctor.         Increasing GI upset- most likely anxiety related-improving:  He is eating better. His weight has stabilized and he has actually gained a few pounds now. Diabetes: Continue aggressive glycemic control  Dyslipidemia: On statin therapy  Prior hypercoagulable state: On anticoagulation     Subjective: I headaches are getting better   Dean Landers is a 80 y.o. male who returns to the neurology clinic at [facility] for an evaluation. He was initially seen in the neurology clinic on November 22, 2022. Please see my history of present illness, examination, and treatment plan from that day. He was initially hospitalized and seen by one of my colleagues for a headache. He had significant neuroimaging performed during that hospitalization that was unremarkable. My first encounter with the patient was in November 2022. Since that time, the patient reports that his headaches have improved. He is only taking rare 325 mg Tylenol. He does not need to take 1 every day. Surgeon visit was arranged due to concern if additional testing would be necessary due to the headaches. He states the headaches are holocephalic but mild and not nearly as intense. He is no longer taking any of the prophylactic or abortive medications. He had been prescribed amitriptyline, Fioricet and Nurtec in the past but is not taking any of those. He also states that his appetite has improved. He is eating more and has begun to gain some weight back. He still is battling with depression and anxiety since his COVID infection but does feel that things are improving. He has cut down on his antianxiety medication. He does ask about other potential treatment options for anxiety. He states his sleep has improved. His primary care doctor has arranged for him to have a CT scan of his chest looking for potential lung nodule as a follow-up. He was discharged on amitriptyline, Fioricet, aspirin and melatonin.     He denies any focal numbness, tingling, or weakness. Prior to Admission medications    Medication Sig Start Date End Date Taking? Authorizing Provider   mirtazapine (REMERON) 45 mg tablet Take 45 mg by mouth as needed. 2/1/23  Yes Provider, Historical   acetaminophen (TYLENOL) 325 mg tablet Take 2 Tablets by mouth every six (6) hours as needed for Pain or Fever. 10/30/22  Yes Lia Rios MD   metoprolol tartrate (LOPRESSOR) 25 mg tablet Take 1 Tab by mouth two (2) times a day. 3/14/17  Yes Genaro Cohen MD   apixaban (ELIQUIS) 5 mg tablet Take 1 Tab by mouth two (2) times a day. Indications: Pulmonary Thromboembolism 3/14/17  Yes Genaro Cohen MD   atorvastatin (LIPITOR) 10 mg tablet Take 1 Tab by mouth nightly. 3/14/17  Yes Genaro Cohen MD   metFORMIN (GLUCOPHAGE) 1,000 mg tablet Take 500 mg by mouth three (3) times daily. Yes Provider, Historical   tamsulosin (FLOMAX) 0.4 mg capsule Take 0.4 mg by mouth daily. 1/13/16  Yes Provider, Historical   therapeutic multivitamin (THERAGRAN) tablet Take 1 Tab by mouth daily. Yes Provider, Historical   nortriptyline (PAMELOR) 10 mg capsule Take 1 Capsule by mouth nightly. Patient not taking: Reported on 2/16/2023 12/9/22   Gerber BURROWS DO   rimegepant (NURTEC) 75 mg disintegrating tablet Take 1 Tablet by mouth daily as needed for Migraine (no more than 8 pills in one month). Patient not taking: Reported on 2/16/2023 12/9/22   Gerber BURROWS DO   hydrOXYzine HCL (ATARAX) 10 mg tablet TAKE 1 OR 2 TABLETS BY MOUTH AT BEDTIME AS NEEDED FOR SLEEP  Patient not taking: No sig reported 11/15/22   Provider, Historical             No data recorded     Sherin Newton was evaluated through a patient-initiated, synchronous (real-time) audio only encounter. He (or guardian if applicable) is aware that it is a billable service, which includes applicable co-pays, with coverage as determined by his insurance carrier.  This visit was conducted with the patient's (and/or Donna Shanks guardian's) verbal consent. He has not had a related appointment within my department in the past 7 days or scheduled within the next 24 hours. The patient was located in a state where the provider was licensed to provide care. The patient was located at: Home: [id number]  The provider was located at:  Facility (Appt Department): [address]    Total Time: minutes: 21-30 minutes    Angela Rubio DO FAAN      Signed By:  Angela Rubio DO FAAN    February 17, 2023

## 2023-02-17 ENCOUNTER — OFFICE VISIT (OUTPATIENT)
Dept: NEUROLOGY | Age: 83
End: 2023-02-17
Payer: MEDICARE

## 2023-02-17 ENCOUNTER — DOCUMENTATION ONLY (OUTPATIENT)
Dept: NEUROLOGY | Age: 83
End: 2023-02-17

## 2023-02-17 DIAGNOSIS — U09.9 POST-COVID CHRONIC NEUROLOGIC SYMPTOMS: ICD-10-CM

## 2023-02-17 DIAGNOSIS — G44.211 INTRACTABLE EPISODIC TENSION-TYPE HEADACHE: Primary | ICD-10-CM

## 2023-02-17 DIAGNOSIS — R29.90 POST-COVID CHRONIC NEUROLOGIC SYMPTOMS: ICD-10-CM

## 2023-02-17 NOTE — LETTER
2/17/2023    Patient: German Landry   YOB: 1940   Date of Visit: 2/17/2023     Adela Duran MD  [id number] [phone]  Via Fax: [phone]    Dear Adela Duran MD,      Thank you for referring Mr. Weston Connelly to [address] [place] [address] for evaluation. My notes for this consultation are attached. If you have questions, please do not hesitate to call me. I look forward to following your patient along with you.       Sincerely,    Ranjan Mooney, DO

## 2023-02-23 ENCOUNTER — HOSPITAL ENCOUNTER (OUTPATIENT)
Dept: CT IMAGING | Age: 83
Discharge: HOME | End: 2023-02-23
Attending: FAMILY MEDICINE
Payer: MEDICARE

## 2023-02-23 DIAGNOSIS — R91.8 OTHER NONSPECIFIC ABNORMAL FINDING OF LUNG FIELD: ICD-10-CM

## 2023-02-23 LAB — CREAT BLD-MCNC: 1.2 MG/DL (ref 0.6–1.3)

## 2023-02-23 PROCEDURE — 71260 CT THORAX DX C+: CPT

## 2023-02-23 PROCEDURE — 82565 ASSAY OF CREATININE: CPT

## 2023-02-23 PROCEDURE — 74011000636 HC RX REV CODE- 636: Performed by: FAMILY MEDICINE

## 2023-02-23 RX ADMIN — IOPAMIDOL 80 ML: 755 INJECTION, SOLUTION INTRAVENOUS at 12:21

## 2023-03-29 ENCOUNTER — TRANSCRIBE ORDER (OUTPATIENT)
Dept: SCHEDULING | Age: 83
End: 2023-03-29

## 2023-03-29 DIAGNOSIS — R91.8 MULTIPLE PULMONARY NODULES: Primary | ICD-10-CM

## 2023-04-22 ENCOUNTER — TRANSCRIBE ORDERS (OUTPATIENT)
Facility: HOSPITAL | Age: 83
End: 2023-04-22

## 2023-04-22 DIAGNOSIS — R91.8 MULTIPLE LUNG NODULES: Primary | ICD-10-CM

## 2023-04-23 DIAGNOSIS — R91.8 MULTIPLE PULMONARY NODULES: Primary | ICD-10-CM

## 2023-09-05 ENCOUNTER — HOSPITAL ENCOUNTER (OUTPATIENT)
Facility: HOSPITAL | Age: 83
Discharge: HOME | End: 2023-09-08
Payer: MEDICARE

## 2023-09-05 DIAGNOSIS — R91.8 MULTIPLE LUNG NODULES: ICD-10-CM

## 2023-09-05 PROCEDURE — 71250 CT THORAX DX C-: CPT

## 2023-09-18 ENCOUNTER — TRANSCRIBE ORDERS (OUTPATIENT)
Facility: HOSPITAL | Age: 83
End: 2023-09-18

## 2023-09-18 DIAGNOSIS — R91.8 MULTIPLE PULMONARY NODULES: Primary | ICD-10-CM

## 2023-10-31 ENCOUNTER — HOSPITAL ENCOUNTER (EMERGENCY)
Facility: HOSPITAL | Age: 83
Discharge: HOME | End: 2023-10-31
Attending: EMERGENCY MEDICINE
Payer: MEDICARE

## 2023-10-31 ENCOUNTER — APPOINTMENT (OUTPATIENT)
Facility: HOSPITAL | Age: 83
End: 2023-10-31
Payer: MEDICARE

## 2023-10-31 VITALS
BODY MASS INDEX: 17.8 KG/M2 | TEMPERATURE: 97.5 F | SYSTOLIC BLOOD PRESSURE: 180 MMHG | HEART RATE: 78 BPM | DIASTOLIC BLOOD PRESSURE: 72 MMHG | WEIGHT: 138.67 LBS | OXYGEN SATURATION: 99 % | RESPIRATION RATE: 16 BRPM

## 2023-10-31 DIAGNOSIS — G44.219 EPISODIC TENSION-TYPE HEADACHE, NOT INTRACTABLE: Primary | ICD-10-CM

## 2023-10-31 LAB
ALBUMIN SERPL-MCNC: 3.4 G/DL (ref 3.5–5)
ALBUMIN/GLOB SERPL: 0.9 (ref 1.1–2.2)
ALP SERPL-CCNC: 80 U/L (ref 45–117)
ALT SERPL-CCNC: 18 U/L (ref 12–78)
ANION GAP SERPL CALC-SCNC: 2 MMOL/L (ref 5–15)
AST SERPL-CCNC: 12 U/L (ref 15–37)
BASOPHILS # BLD: 0.1 K/UL (ref 0–0.1)
BASOPHILS NFR BLD: 1 % (ref 0–1)
BILIRUB SERPL-MCNC: 0.7 MG/DL (ref 0.2–1)
BUN SERPL-MCNC: 16 MG/DL (ref 6–20)
BUN/CREAT SERPL: 15 (ref 12–20)
CALCIUM SERPL-MCNC: 9 MG/DL (ref 8.5–10.1)
CHLORIDE SERPL-SCNC: 106 MMOL/L (ref 97–108)
CO2 SERPL-SCNC: 28 MMOL/L (ref 21–32)
COMMENT:: NORMAL
COMMENT:: NORMAL
CREAT SERPL-MCNC: 1.09 MG/DL (ref 0.7–1.3)
DIFFERENTIAL METHOD BLD: ABNORMAL
EKG ATRIAL RATE: 72 BPM
EKG DIAGNOSIS: NORMAL
EKG P AXIS: 86 DEGREES
EKG P-R INTERVAL: 168 MS
EKG Q-T INTERVAL: 410 MS
EKG QRS DURATION: 128 MS
EKG QTC CALCULATION (BAZETT): 448 MS
EKG R AXIS: 142 DEGREES
EKG T AXIS: 75 DEGREES
EKG VENTRICULAR RATE: 72 BPM
EOSINOPHIL # BLD: 0.1 K/UL (ref 0–0.4)
EOSINOPHIL NFR BLD: 2 % (ref 0–7)
ERYTHROCYTE [DISTWIDTH] IN BLOOD BY AUTOMATED COUNT: 12.1 % (ref 11.5–14.5)
GLOBULIN SER CALC-MCNC: 3.9 G/DL (ref 2–4)
GLUCOSE SERPL-MCNC: 223 MG/DL (ref 65–100)
HCT VFR BLD AUTO: 42.2 % (ref 36.6–50.3)
HGB BLD-MCNC: 14 G/DL (ref 12.1–17)
IMM GRANULOCYTES # BLD AUTO: 0 K/UL (ref 0–0.04)
IMM GRANULOCYTES NFR BLD AUTO: 0 % (ref 0–0.5)
LYMPHOCYTES # BLD: 0.9 K/UL (ref 0.8–3.5)
LYMPHOCYTES NFR BLD: 19 % (ref 12–49)
MCH RBC QN AUTO: 32.7 PG (ref 26–34)
MCHC RBC AUTO-ENTMCNC: 33.2 G/DL (ref 30–36.5)
MCV RBC AUTO: 98.6 FL (ref 80–99)
MONOCYTES # BLD: 0.3 K/UL (ref 0–1)
MONOCYTES NFR BLD: 7 % (ref 5–13)
NEUTS SEG # BLD: 3.4 K/UL (ref 1.8–8)
NEUTS SEG NFR BLD: 71 % (ref 32–75)
NRBC # BLD: 0 K/UL (ref 0–0.01)
NRBC BLD-RTO: 0 PER 100 WBC
PLATELET # BLD AUTO: 195 K/UL (ref 150–400)
PMV BLD AUTO: 8.5 FL (ref 8.9–12.9)
POTASSIUM SERPL-SCNC: 4.4 MMOL/L (ref 3.5–5.1)
PROT SERPL-MCNC: 7.3 G/DL (ref 6.4–8.2)
RBC # BLD AUTO: 4.28 M/UL (ref 4.1–5.7)
SODIUM SERPL-SCNC: 136 MMOL/L (ref 136–145)
SPECIMEN HOLD: NORMAL
WBC # BLD AUTO: 4.8 K/UL (ref 4.1–11.1)

## 2023-10-31 PROCEDURE — 99285 EMERGENCY DEPT VISIT HI MDM: CPT

## 2023-10-31 PROCEDURE — 6370000000 HC RX 637 (ALT 250 FOR IP): Performed by: EMERGENCY MEDICINE

## 2023-10-31 PROCEDURE — 36415 COLL VENOUS BLD VENIPUNCTURE: CPT

## 2023-10-31 PROCEDURE — 85025 COMPLETE CBC W/AUTO DIFF WBC: CPT

## 2023-10-31 PROCEDURE — 80053 COMPREHEN METABOLIC PANEL: CPT

## 2023-10-31 PROCEDURE — 70450 CT HEAD/BRAIN W/O DYE: CPT

## 2023-10-31 PROCEDURE — 93005 ELECTROCARDIOGRAM TRACING: CPT | Performed by: EMERGENCY MEDICINE

## 2023-10-31 RX ORDER — BUTALBITAL, ACETAMINOPHEN AND CAFFEINE 50; 325; 40 MG/1; MG/1; MG/1
1 TABLET ORAL
Status: COMPLETED | OUTPATIENT
Start: 2023-10-31 | End: 2023-10-31

## 2023-10-31 RX ORDER — BUTALBITAL, ACETAMINOPHEN, CAFFEINE AND CODEINE PHOSPHATE 50; 325; 40; 30 MG/1; MG/1; MG/1; MG/1
1 CAPSULE ORAL EVERY 6 HOURS PRN
Qty: 10 CAPSULE | Refills: 0 | Status: SHIPPED | OUTPATIENT
Start: 2023-10-31 | End: 2023-11-10

## 2023-10-31 RX ADMIN — BUTALBITAL, ACETAMINOPHEN, AND CAFFEINE 1 TABLET: 50; 325; 40 TABLET ORAL at 16:21

## 2023-10-31 ASSESSMENT — ENCOUNTER SYMPTOMS
SORE THROAT: 0
RHINORRHEA: 0
ABDOMINAL PAIN: 0
BACK PAIN: 0
VOMITING: 0
COUGH: 0
SHORTNESS OF BREATH: 0

## 2023-10-31 NOTE — ED TRIAGE NOTES
Pt c/o headaches x one year , denies head trauma, +blood thinners, bilateral leg tingling x one week, denies fever, denies sob, cp, sore throat or cough today, had sob last night, denies leg swelling , pt stated he is getting confused at night , every night , +takes a sleeping pill at night

## 2023-11-08 ENCOUNTER — HOSPITAL ENCOUNTER (OUTPATIENT)
Facility: HOSPITAL | Age: 83
Discharge: HOME | End: 2023-11-11
Attending: FAMILY MEDICINE
Payer: MEDICARE

## 2023-11-08 DIAGNOSIS — R51.9 NONINTRACTABLE HEADACHE, UNSPECIFIED CHRONICITY PATTERN, UNSPECIFIED HEADACHE TYPE: ICD-10-CM

## 2023-11-08 PROCEDURE — 70551 MRI BRAIN STEM W/O DYE: CPT

## 2024-01-31 ENCOUNTER — HOSPITAL ENCOUNTER (OUTPATIENT)
Facility: HOSPITAL | Age: 84
Discharge: HOME | End: 2024-02-03
Attending: FAMILY MEDICINE
Payer: MEDICARE

## 2024-01-31 DIAGNOSIS — R51.9 NONINTRACTABLE HEADACHE, UNSPECIFIED CHRONICITY PATTERN, UNSPECIFIED HEADACHE TYPE: ICD-10-CM

## 2024-01-31 PROCEDURE — 70450 CT HEAD/BRAIN W/O DYE: CPT
